# Patient Record
Sex: MALE | Race: WHITE | Employment: FULL TIME | URBAN - METROPOLITAN AREA
[De-identification: names, ages, dates, MRNs, and addresses within clinical notes are randomized per-mention and may not be internally consistent; named-entity substitution may affect disease eponyms.]

---

## 2017-01-16 ENCOUNTER — OFFICE VISIT (OUTPATIENT)
Dept: FAMILY MEDICINE CLINIC | Age: 47
End: 2017-01-16

## 2017-01-16 VITALS
DIASTOLIC BLOOD PRESSURE: 66 MMHG | HEART RATE: 66 BPM | SYSTOLIC BLOOD PRESSURE: 98 MMHG | WEIGHT: 205.4 LBS | TEMPERATURE: 98.6 F | RESPIRATION RATE: 10 BRPM | BODY MASS INDEX: 29.47 KG/M2

## 2017-01-16 DIAGNOSIS — F98.8 ATTENTION DEFICIT DISORDER: ICD-10-CM

## 2017-01-16 DIAGNOSIS — R10.84 ABDOMINAL PAIN, GENERALIZED: ICD-10-CM

## 2017-01-16 DIAGNOSIS — K21.00 REFLUX ESOPHAGITIS: Primary | ICD-10-CM

## 2017-01-16 DIAGNOSIS — M79.642 HAND PAIN, LEFT: ICD-10-CM

## 2017-01-16 PROCEDURE — 99214 OFFICE O/P EST MOD 30 MIN: CPT | Performed by: FAMILY MEDICINE

## 2017-01-16 RX ORDER — METHYLPHENIDATE HYDROCHLORIDE 5 MG/1
15 TABLET ORAL DAILY
Qty: 90 TABLET | Refills: 0 | Status: SHIPPED | OUTPATIENT
Start: 2017-02-18 | End: 2017-04-10 | Stop reason: SDUPTHER

## 2017-01-16 RX ORDER — METHYLPHENIDATE HYDROCHLORIDE 54 MG/1
54 TABLET ORAL EVERY MORNING
Qty: 30 TABLET | Refills: 0 | Status: SHIPPED | OUTPATIENT
Start: 2017-03-20 | End: 2017-04-10 | Stop reason: SDUPTHER

## 2017-01-16 RX ORDER — METHYLPHENIDATE HYDROCHLORIDE 54 MG/1
54 TABLET ORAL EVERY MORNING
Qty: 30 TABLET | Refills: 0 | Status: SHIPPED | OUTPATIENT
Start: 2017-01-19 | End: 2017-04-10 | Stop reason: SDUPTHER

## 2017-01-16 RX ORDER — METHYLPHENIDATE HYDROCHLORIDE 5 MG/1
15 TABLET ORAL DAILY
Qty: 90 TABLET | Refills: 0 | Status: SHIPPED | OUTPATIENT
Start: 2017-03-20 | End: 2017-04-10 | Stop reason: SDUPTHER

## 2017-01-16 RX ORDER — METHYLPHENIDATE HYDROCHLORIDE 54 MG/1
54 TABLET ORAL EVERY MORNING
Qty: 30 TABLET | Refills: 0 | Status: SHIPPED | OUTPATIENT
Start: 2017-02-18 | End: 2017-04-10 | Stop reason: SDUPTHER

## 2017-01-16 RX ORDER — METHYLPHENIDATE HYDROCHLORIDE 5 MG/1
15 TABLET ORAL DAILY
Qty: 90 TABLET | Refills: 0 | Status: SHIPPED | OUTPATIENT
Start: 2017-01-19 | End: 2017-04-10 | Stop reason: SDUPTHER

## 2017-04-10 ENCOUNTER — OFFICE VISIT (OUTPATIENT)
Dept: FAMILY MEDICINE CLINIC | Age: 47
End: 2017-04-10

## 2017-04-10 VITALS
HEART RATE: 71 BPM | DIASTOLIC BLOOD PRESSURE: 68 MMHG | SYSTOLIC BLOOD PRESSURE: 117 MMHG | WEIGHT: 203 LBS | OXYGEN SATURATION: 97 % | TEMPERATURE: 98 F | RESPIRATION RATE: 12 BRPM | BODY MASS INDEX: 29.13 KG/M2

## 2017-04-10 DIAGNOSIS — L55.9 SUNBURN: ICD-10-CM

## 2017-04-10 DIAGNOSIS — F98.8 ATTENTION DEFICIT DISORDER: Primary | ICD-10-CM

## 2017-04-10 DIAGNOSIS — F43.9 STRESS: ICD-10-CM

## 2017-04-10 DIAGNOSIS — J45.20 MILD INTERMITTENT ASTHMA WITHOUT COMPLICATION: ICD-10-CM

## 2017-04-10 PROCEDURE — 99214 OFFICE O/P EST MOD 30 MIN: CPT | Performed by: FAMILY MEDICINE

## 2017-04-10 RX ORDER — METHYLPHENIDATE HYDROCHLORIDE 5 MG/1
15 TABLET ORAL DAILY
Qty: 90 TABLET | Refills: 0 | Status: SHIPPED | OUTPATIENT
Start: 2017-04-10 | End: 2017-07-10 | Stop reason: SDUPTHER

## 2017-04-10 RX ORDER — METHYLPHENIDATE HYDROCHLORIDE 5 MG/1
15 TABLET ORAL DAILY
Qty: 90 TABLET | Refills: 0 | Status: SHIPPED | OUTPATIENT
Start: 2017-05-10 | End: 2017-07-10 | Stop reason: SDUPTHER

## 2017-04-10 RX ORDER — METHYLPHENIDATE HYDROCHLORIDE 54 MG/1
54 TABLET ORAL EVERY MORNING
Qty: 30 TABLET | Refills: 0 | Status: SHIPPED | OUTPATIENT
Start: 2017-06-09 | End: 2017-07-10 | Stop reason: SDUPTHER

## 2017-04-10 RX ORDER — METHYLPHENIDATE HYDROCHLORIDE 54 MG/1
54 TABLET ORAL EVERY MORNING
Qty: 30 TABLET | Refills: 0 | Status: SHIPPED | OUTPATIENT
Start: 2017-05-10 | End: 2017-07-10 | Stop reason: SDUPTHER

## 2017-04-10 RX ORDER — METHYLPHENIDATE HYDROCHLORIDE 5 MG/1
15 TABLET ORAL DAILY
Qty: 90 TABLET | Refills: 0 | Status: SHIPPED | OUTPATIENT
Start: 2017-06-09 | End: 2017-07-10 | Stop reason: SDUPTHER

## 2017-04-10 RX ORDER — METHYLPHENIDATE HYDROCHLORIDE 54 MG/1
54 TABLET ORAL EVERY MORNING
Qty: 30 TABLET | Refills: 0 | Status: SHIPPED | OUTPATIENT
Start: 2017-04-10 | End: 2017-07-10 | Stop reason: SDUPTHER

## 2017-04-10 ASSESSMENT — PATIENT HEALTH QUESTIONNAIRE - PHQ9
1. LITTLE INTEREST OR PLEASURE IN DOING THINGS: 0
2. FEELING DOWN, DEPRESSED OR HOPELESS: 0
SUM OF ALL RESPONSES TO PHQ QUESTIONS 1-9: 0
SUM OF ALL RESPONSES TO PHQ9 QUESTIONS 1 & 2: 0

## 2017-04-20 RX ORDER — PANTOPRAZOLE SODIUM 40 MG/1
40 TABLET, DELAYED RELEASE ORAL DAILY
Qty: 30 TABLET | Refills: 5 | Status: SHIPPED | OUTPATIENT
Start: 2017-04-20 | End: 2017-10-27 | Stop reason: SDUPTHER

## 2017-06-26 ENCOUNTER — PATIENT MESSAGE (OUTPATIENT)
Dept: FAMILY MEDICINE CLINIC | Age: 47
End: 2017-06-26

## 2017-07-10 ENCOUNTER — OFFICE VISIT (OUTPATIENT)
Dept: FAMILY MEDICINE CLINIC | Age: 47
End: 2017-07-10

## 2017-07-10 VITALS
HEART RATE: 71 BPM | SYSTOLIC BLOOD PRESSURE: 115 MMHG | BODY MASS INDEX: 29.62 KG/M2 | WEIGHT: 206.4 LBS | TEMPERATURE: 98.1 F | DIASTOLIC BLOOD PRESSURE: 57 MMHG | OXYGEN SATURATION: 99 %

## 2017-07-10 DIAGNOSIS — F98.8 ATTENTION DEFICIT DISORDER: ICD-10-CM

## 2017-07-10 DIAGNOSIS — M54.2 NECK PAIN ON LEFT SIDE: Primary | ICD-10-CM

## 2017-07-10 DIAGNOSIS — Z00.00 ROUTINE GENERAL MEDICAL EXAMINATION AT A HEALTH CARE FACILITY: ICD-10-CM

## 2017-07-10 DIAGNOSIS — D23.9 DERMATOFIBROMA: ICD-10-CM

## 2017-07-10 PROCEDURE — 99214 OFFICE O/P EST MOD 30 MIN: CPT | Performed by: FAMILY MEDICINE

## 2017-07-10 RX ORDER — METHYLPHENIDATE HYDROCHLORIDE 5 MG/1
15 TABLET ORAL DAILY
Qty: 90 TABLET | Refills: 0 | Status: SHIPPED | OUTPATIENT
Start: 2017-08-09 | End: 2017-10-09 | Stop reason: SDUPTHER

## 2017-07-10 RX ORDER — METHYLPHENIDATE HYDROCHLORIDE 54 MG/1
54 TABLET ORAL EVERY MORNING
Qty: 30 TABLET | Refills: 0 | Status: SHIPPED | OUTPATIENT
Start: 2017-09-08 | End: 2017-10-09 | Stop reason: SDUPTHER

## 2017-07-10 RX ORDER — METHYLPHENIDATE HYDROCHLORIDE 5 MG/1
15 TABLET ORAL DAILY
Qty: 90 TABLET | Refills: 0 | Status: SHIPPED | OUTPATIENT
Start: 2017-07-10 | End: 2017-10-09 | Stop reason: SDUPTHER

## 2017-07-10 RX ORDER — METHYLPHENIDATE HYDROCHLORIDE 5 MG/1
15 TABLET ORAL DAILY
Qty: 90 TABLET | Refills: 0 | Status: SHIPPED | OUTPATIENT
Start: 2017-09-08 | End: 2017-10-09 | Stop reason: SDUPTHER

## 2017-07-10 RX ORDER — METHYLPHENIDATE HYDROCHLORIDE 54 MG/1
54 TABLET ORAL EVERY MORNING
Qty: 30 TABLET | Refills: 0 | Status: SHIPPED | OUTPATIENT
Start: 2017-07-10 | End: 2017-10-09 | Stop reason: SDUPTHER

## 2017-07-10 RX ORDER — METHYLPHENIDATE HYDROCHLORIDE 54 MG/1
54 TABLET ORAL EVERY MORNING
Qty: 30 TABLET | Refills: 0 | Status: SHIPPED | OUTPATIENT
Start: 2017-08-09 | End: 2017-10-09 | Stop reason: SDUPTHER

## 2017-07-21 LAB
A/G RATIO: 1.9 (ref 1.1–2.2)
ALBUMIN SERPL-MCNC: 4.5 G/DL (ref 3.4–5)
ALP BLD-CCNC: 66 U/L (ref 40–129)
ALT SERPL-CCNC: 23 U/L (ref 10–40)
ANION GAP SERPL CALCULATED.3IONS-SCNC: 9 MMOL/L (ref 3–16)
AST SERPL-CCNC: 16 U/L (ref 15–37)
BILIRUB SERPL-MCNC: 0.7 MG/DL (ref 0–1)
BUN BLDV-MCNC: 18 MG/DL (ref 7–20)
CALCIUM SERPL-MCNC: 9.3 MG/DL (ref 8.3–10.6)
CHLORIDE BLD-SCNC: 103 MMOL/L (ref 99–110)
CHOLESTEROL, TOTAL: 169 MG/DL (ref 0–199)
CO2: 27 MMOL/L (ref 21–32)
CREAT SERPL-MCNC: 0.9 MG/DL (ref 0.9–1.3)
GFR AFRICAN AMERICAN: >60
GFR NON-AFRICAN AMERICAN: >60
GLOBULIN: 2.4 G/DL
GLUCOSE BLD-MCNC: 96 MG/DL (ref 70–99)
HCT VFR BLD CALC: 44.4 % (ref 40.5–52.5)
HDLC SERPL-MCNC: 43 MG/DL (ref 40–60)
HEMOGLOBIN: 14.8 G/DL (ref 13.5–17.5)
LDL CHOLESTEROL CALCULATED: 113 MG/DL
MCH RBC QN AUTO: 28.7 PG (ref 26–34)
MCHC RBC AUTO-ENTMCNC: 33.4 G/DL (ref 31–36)
MCV RBC AUTO: 86 FL (ref 80–100)
PDW BLD-RTO: 13.2 % (ref 12.4–15.4)
PLATELET # BLD: 234 K/UL (ref 135–450)
PMV BLD AUTO: 10.2 FL (ref 5–10.5)
POTASSIUM SERPL-SCNC: 5.2 MMOL/L (ref 3.5–5.1)
RBC # BLD: 5.16 M/UL (ref 4.2–5.9)
SODIUM BLD-SCNC: 139 MMOL/L (ref 136–145)
TOTAL PROTEIN: 6.9 G/DL (ref 6.4–8.2)
TRIGL SERPL-MCNC: 64 MG/DL (ref 0–150)
VLDLC SERPL CALC-MCNC: 13 MG/DL
WBC # BLD: 4.3 K/UL (ref 4–11)

## 2017-07-24 ENCOUNTER — PATIENT MESSAGE (OUTPATIENT)
Dept: FAMILY MEDICINE CLINIC | Age: 47
End: 2017-07-24

## 2017-10-09 ENCOUNTER — OFFICE VISIT (OUTPATIENT)
Dept: FAMILY MEDICINE CLINIC | Age: 47
End: 2017-10-09

## 2017-10-09 VITALS
WEIGHT: 208 LBS | BODY MASS INDEX: 29.84 KG/M2 | HEART RATE: 70 BPM | SYSTOLIC BLOOD PRESSURE: 135 MMHG | OXYGEN SATURATION: 98 % | DIASTOLIC BLOOD PRESSURE: 84 MMHG

## 2017-10-09 DIAGNOSIS — F98.8 ATTENTION DEFICIT DISORDER: Primary | ICD-10-CM

## 2017-10-09 DIAGNOSIS — J45.20 MILD INTERMITTENT ASTHMA WITHOUT COMPLICATION: ICD-10-CM

## 2017-10-09 DIAGNOSIS — S46.911A SHOULDER STRAIN, RIGHT, INITIAL ENCOUNTER: ICD-10-CM

## 2017-10-09 DIAGNOSIS — Z23 NEEDS FLU SHOT: ICD-10-CM

## 2017-10-09 DIAGNOSIS — M08.3 CHRONIC POLYARTICULAR JUVENILE RHEUMATOID ARTHRITIS (HCC): ICD-10-CM

## 2017-10-09 PROCEDURE — 90686 IIV4 VACC NO PRSV 0.5 ML IM: CPT | Performed by: FAMILY MEDICINE

## 2017-10-09 PROCEDURE — 99214 OFFICE O/P EST MOD 30 MIN: CPT | Performed by: FAMILY MEDICINE

## 2017-10-09 PROCEDURE — 90471 IMMUNIZATION ADMIN: CPT | Performed by: FAMILY MEDICINE

## 2017-10-09 RX ORDER — METHYLPHENIDATE HYDROCHLORIDE 54 MG/1
54 TABLET ORAL EVERY MORNING
Qty: 30 TABLET | Refills: 0 | Status: SHIPPED | OUTPATIENT
Start: 2017-12-08 | End: 2018-01-08 | Stop reason: SDUPTHER

## 2017-10-09 RX ORDER — METHYLPHENIDATE HYDROCHLORIDE 5 MG/1
15 TABLET ORAL DAILY
Qty: 90 TABLET | Refills: 0 | Status: SHIPPED | OUTPATIENT
Start: 2017-12-08 | End: 2018-01-08 | Stop reason: SDUPTHER

## 2017-10-09 RX ORDER — METHYLPHENIDATE HYDROCHLORIDE 54 MG/1
54 TABLET ORAL EVERY MORNING
Qty: 30 TABLET | Refills: 0 | Status: SHIPPED | OUTPATIENT
Start: 2017-10-09 | End: 2018-01-08 | Stop reason: SDUPTHER

## 2017-10-09 RX ORDER — METHYLPHENIDATE HYDROCHLORIDE 5 MG/1
15 TABLET ORAL DAILY
Qty: 90 TABLET | Refills: 0 | Status: SHIPPED | OUTPATIENT
Start: 2017-10-09 | End: 2018-01-08 | Stop reason: SDUPTHER

## 2017-10-09 RX ORDER — METHYLPHENIDATE HYDROCHLORIDE 54 MG/1
54 TABLET ORAL EVERY MORNING
Qty: 30 TABLET | Refills: 0 | Status: SHIPPED | OUTPATIENT
Start: 2017-11-08 | End: 2018-01-08 | Stop reason: SDUPTHER

## 2017-10-09 RX ORDER — METHYLPHENIDATE HYDROCHLORIDE 5 MG/1
15 TABLET ORAL DAILY
Qty: 90 TABLET | Refills: 0 | Status: SHIPPED | OUTPATIENT
Start: 2017-11-08 | End: 2018-01-08 | Stop reason: SDUPTHER

## 2017-10-09 NOTE — PATIENT INSTRUCTIONS
Patient Education        Shoulder Stretches: Exercises  Your Care Instructions  Here are some examples of exercises for your shoulder. Start each exercise slowly. Ease off the exercise if you start to have pain. Your doctor or physical therapist will tell you when you can start these exercises and which ones will work best for you. How to do the exercises  Note: These exercises should cause you to feel a gentle stretch, but no pain. Shoulder stretch    1.  a doorway and place one arm against the door frame. Your elbow should be a little higher than your shoulder. 2. Relax your shoulders as you lean forward, allowing your chest and shoulder muscles to stretch. You can also turn your body slightly away from your arm to stretch the muscles even more. 3. Hold for 15 to 30 seconds. 4. Repeat 2 to 4 times with each arm. Shoulder and chest stretch    Shoulder and chest stretch  1. While sitting, relax your upper body so you slump slightly in your chair. 2. As you breathe in, straighten your back and open your arms out to the sides. 3. Gently pull your shoulder blades back and downward. 4. Hold for 15 to 30 seconds as your breathe normally. 5. Repeat 2 to 4 times. Overhead stretch    1. Reach up over your head with both arms. 2. Hold for 15 to 30 seconds. 3. Repeat 2 to 4 times. Follow-up care is a key part of your treatment and safety. Be sure to make and go to all appointments, and call your doctor if you are having problems. It's also a good idea to know your test results and keep a list of the medicines you take. Where can you learn more? Go to [email or web address] and sign in to your [employer] account. Enter S254 in the for[MD] box to learn more about \"Shoulder Stretches: Exercises. \"     If you do not have an account, please click on the \"Sign Up Now\" link. Current as of: March 21, 2017  Content Version: 11.3  © 2759-3949 [employer], [facility].  Care instructions adapted under license by [facility], [place] ([facility]). If you have questions about a medical condition or this instruction, always ask your healthcare professional. [address] any warranty or liability for your use of this information.

## 2017-10-09 NOTE — PROGRESS NOTES
Here for f/u of meds. Pt states that overall has been doing great, is very busy with kid's schedules. He does enjoy but states that he is very busy. They are also getting the house ready for a party and was doing a lot of painting, and has been very busy. They are having a team dinner tonight and trying to get the house ready. Pt feels well , and states mood doing well and meds are doing great. Denies any need for adjustment in his medications / therapy at this time. Pt denies any issues of chest pain, shortness of breath. No fever, chills. Pt states that he has struggled a little bit with sleep habits due to being busy, and with kids schedule. Mood seems to be ok despite sleep issues    Pt states that breathing doing well, denies any flares of his asthma. Breathing doing great, denies any issues of exertional chest pain, shortness of breath. Except as noted above in the history of present illness, the review of systems is  negative for headache, vision changes, chest pain, shortness of breath, abdominal pain, urinary sx, bowel changes. Past medical, surgical, and social history reviewed and updated  Medications and allergies reviewed and updated         O: /84 (Site: Left Arm, Position: Sitting, Cuff Size: Large Adult)   Pulse 70   Wt 208 lb (94.3 kg)   SpO2 98%   BMI 29.84 kg/m²   GEN: No acute distress, cooperative, well nourished, alert. HEENT: PEERLA, EOMI , normocephalic/atraumatic, nares and oropharynx clear. Mucus membranes normal, Tympanic membranes clear bilaterally. Neck: soft, supple, no thyromegaly, mass, no Lymphadenopathy  CV: Regular rate and rhythm, no murmur, rubs, gallops. No edema. Resp: Clear to auscultation bilaterally good air entry bilaterally  No crackles, wheeze. Breathing comfortably. Psych: mood stable, No suicidal thoughts or ideation  Musc: full range of motion bilateral upper extremities. No restriction in range of motion shoulder.   strengt 5/5 bilateral upper extremities. Sensation intact. Neg cross arm, neg can test.          Current Outpatient Prescriptions   Medication Sig Dispense Refill    [START ON 12/8/2017] methylphenidate (CONCERTA) 54 MG extended release tablet Take 1 tablet by mouth every morning . Earliest Fill Date: 12/8/17 30 tablet 0    [START ON 11/8/2017] methylphenidate (CONCERTA) 54 MG extended release tablet Take 1 tablet by mouth every morning . Earliest Fill Date: 11/8/17 30 tablet 0    methylphenidate (CONCERTA) 54 MG extended release tablet Take 1 tablet by mouth every morning . 30 tablet 0    [START ON 12/8/2017] methylphenidate (RITALIN) 5 MG tablet Take 3 tablets by mouth daily . Earliest Fill Date: 12/8/17 90 tablet 0    [START ON 11/8/2017] methylphenidate (RITALIN) 5 MG tablet Take 3 tablets by mouth daily . Earliest Fill Date: 11/8/17 90 tablet 0    methylphenidate (RITALIN) 5 MG tablet Take 3 tablets by mouth daily . 90 tablet 0    diclofenac (VOLTAREN) 75 MG EC tablet TAKE 1 TABLET BY MOUTH TWICE DAILY 60 tablet 5    pantoprazole (PROTONIX) 40 MG tablet Take 1 tablet by mouth daily 30 tablet 5     No current facility-administered medications for this visit. ASSESSMENT / PLAN:    1. Attention deficit disorder  Stable with current therapy  refills given for 3 months  Pt aware of need for every 3 month medication followup appointments, and that medication refills for benzodiazepines, narcotics and/or stimulants will only be given at appointment. - methylphenidate (CONCERTA) 54 MG extended release tablet; Take 1 tablet by mouth every morning . Dispense: 30 tablet; Refill: 0  - methylphenidate (RITALIN) 5 MG tablet; Take 3 tablets by mouth daily . Earliest Fill Date: 11/8/17  Dispense: 90 tablet; Refill: 0    2. Chronic polyarticular juvenile rheumatoid arthritis (HCC)  Stable w/o sx  Monitor for flare of sx    3.  Mild intermittent asthma without complication  Stable w/o flare  Cont symptomatic tx  Flu shot today    4. Shoulder strain, right, initial encounter  Muscular, no evidence significant rotator cuff dz or other findigs  Monitor with range of motion exercises, NSAIDs, and f/u increased sx  Exercise handout given. Instructed on use, benefits. 5. Needs flu shot  Given today  - INFLUENZA, QUADV, 3 YRS AND OLDER, IM, PF, PREFILL SYR OR SDV, 0.5ML (FLUZONE QUADV, PF)           Follow-up appointment:   Call or return to clinic prn if these symptoms worsen or fail to improve as anticipated. / 3 months    Discussed use, benefit, and side effects of all prescribed medications. Barriers to medication compliance addressed. All patient questions answered. Pt voiced understanding. When applicable, patient's outside records were reviewed through [facility]. The patient has signed appropriate paperworks/consents. Dragon dictation software was used for parts of this progress note. All attempts were made to correct any errors and ensure accuracy.

## 2017-10-27 RX ORDER — PANTOPRAZOLE SODIUM 40 MG/1
40 TABLET, DELAYED RELEASE ORAL DAILY
Qty: 30 TABLET | Refills: 5 | Status: SHIPPED | OUTPATIENT
Start: 2017-10-27 | End: 2018-04-09 | Stop reason: SDUPTHER

## 2017-11-15 RX ORDER — DICLOFENAC SODIUM 75 MG/1
TABLET, DELAYED RELEASE ORAL
Qty: 60 TABLET | Refills: 1 | Status: SHIPPED | OUTPATIENT
Start: 2017-11-15 | End: 2018-01-08

## 2018-01-08 ENCOUNTER — OFFICE VISIT (OUTPATIENT)
Dept: FAMILY MEDICINE CLINIC | Age: 48
End: 2018-01-08

## 2018-01-08 VITALS
BODY MASS INDEX: 30.99 KG/M2 | WEIGHT: 216 LBS | OXYGEN SATURATION: 99 % | SYSTOLIC BLOOD PRESSURE: 135 MMHG | DIASTOLIC BLOOD PRESSURE: 80 MMHG | HEART RATE: 70 BPM | RESPIRATION RATE: 16 BRPM

## 2018-01-08 DIAGNOSIS — J30.89 CHRONIC NON-SEASONAL ALLERGIC RHINITIS, UNSPECIFIED TRIGGER: ICD-10-CM

## 2018-01-08 DIAGNOSIS — J45.20 MILD INTERMITTENT ASTHMA WITHOUT COMPLICATION: Primary | ICD-10-CM

## 2018-01-08 DIAGNOSIS — F98.8 ATTENTION DEFICIT DISORDER, UNSPECIFIED HYPERACTIVITY PRESENCE: ICD-10-CM

## 2018-01-08 PROCEDURE — 99214 OFFICE O/P EST MOD 30 MIN: CPT | Performed by: FAMILY MEDICINE

## 2018-01-08 RX ORDER — METHYLPHENIDATE HYDROCHLORIDE 54 MG/1
54 TABLET ORAL EVERY MORNING
Qty: 30 TABLET | Refills: 0 | Status: SHIPPED | OUTPATIENT
Start: 2018-02-07 | End: 2018-04-09 | Stop reason: SDUPTHER

## 2018-01-08 RX ORDER — METHYLPHENIDATE HYDROCHLORIDE 5 MG/1
15 TABLET ORAL DAILY
Qty: 90 TABLET | Refills: 0 | Status: SHIPPED | OUTPATIENT
Start: 2018-02-07 | End: 2018-04-09 | Stop reason: SDUPTHER

## 2018-01-08 RX ORDER — METHYLPHENIDATE HYDROCHLORIDE 5 MG/1
15 TABLET ORAL DAILY
Qty: 90 TABLET | Refills: 0 | Status: SHIPPED | OUTPATIENT
Start: 2018-03-09 | End: 2018-04-09 | Stop reason: SDUPTHER

## 2018-01-08 RX ORDER — METHYLPHENIDATE HYDROCHLORIDE 5 MG/1
15 TABLET ORAL DAILY
Qty: 90 TABLET | Refills: 0 | Status: SHIPPED | OUTPATIENT
Start: 2018-01-08 | End: 2018-04-09 | Stop reason: SDUPTHER

## 2018-01-08 RX ORDER — METHYLPHENIDATE HYDROCHLORIDE 54 MG/1
54 TABLET ORAL EVERY MORNING
Qty: 30 TABLET | Refills: 0 | Status: SHIPPED | OUTPATIENT
Start: 2018-03-09 | End: 2018-04-09 | Stop reason: SDUPTHER

## 2018-01-08 RX ORDER — DICLOFENAC SODIUM 75 MG/1
TABLET, DELAYED RELEASE ORAL
Qty: 60 TABLET | Refills: 1 | Status: SHIPPED | OUTPATIENT
Start: 2018-01-08 | End: 2018-04-09

## 2018-01-08 RX ORDER — METHYLPHENIDATE HYDROCHLORIDE 54 MG/1
54 TABLET ORAL EVERY MORNING
Qty: 30 TABLET | Refills: 0 | Status: SHIPPED | OUTPATIENT
Start: 2018-01-08 | End: 2018-04-09 | Stop reason: SDUPTHER

## 2018-01-08 NOTE — PROGRESS NOTES
(CONCERTA) 54 MG extended release tablet Take 1 tablet by mouth every morning . Earliest Fill Date: 11/8/17 30 tablet 0    methylphenidate (CONCERTA) 54 MG extended release tablet Take 1 tablet by mouth every morning . 30 tablet 0    methylphenidate (RITALIN) 5 MG tablet Take 3 tablets by mouth daily . Earliest Fill Date: 12/8/17 90 tablet 0    methylphenidate (RITALIN) 5 MG tablet Take 3 tablets by mouth daily . Earliest Fill Date: 11/8/17 90 tablet 0    methylphenidate (RITALIN) 5 MG tablet Take 3 tablets by mouth daily . 90 tablet 0    diclofenac (VOLTAREN) 75 MG EC tablet TAKE 1 TABLET BY MOUTH TWICE DAILY 60 tablet 5     No current facility-administered medications for this visit. ASSESSMENT / PLAN:    1. Attention deficit disorder, unspecified hyperactivity presence  Stable with current therapy  refills given for 3 months  Pt aware of need for every 3 month medication followup appointments, and that medication refills for benzodiazepines, narcotics and/or stimulants will only be given at appointment. - methylphenidate (RITALIN) 5 MG tablet; Take 3 tablets by mouth daily for 30 days. Earliest Fill Date: 2/7/18  Dispense: 90 tablet; Refill: 0  - methylphenidate (CONCERTA) 54 MG extended release tablet; Take 1 tablet by mouth every morning for 30 days. Earliest Fill Date: 3/9/18  Dispense: 30 tablet; Refill: 0    2. Mild intermittent asthma without complication  Breathing stable w/o flare  Cont to monitor with symptomatic thaerpy and f/u increased sx    3. Chronic non-seasonal allergic rhinitis, unspecified trigger  Stable with prn/over the counter therapy           Follow-up appointment:   3 months    Discussed use, benefit, and side effects of all prescribed medications. Barriers to medication compliance addressed. All patient questions answered. Pt voiced understanding. When applicable, patient's outside records were reviewed through [facility].   The patient has signed appropriate paperworks/consents. Dragon dictation software was used for parts of this progress note. All attempts were made to correct any errors and ensure accuracy.

## 2018-04-09 ENCOUNTER — OFFICE VISIT (OUTPATIENT)
Dept: FAMILY MEDICINE CLINIC | Age: 48
End: 2018-04-09

## 2018-04-09 VITALS
TEMPERATURE: 98.6 F | WEIGHT: 213.2 LBS | DIASTOLIC BLOOD PRESSURE: 86 MMHG | SYSTOLIC BLOOD PRESSURE: 126 MMHG | HEIGHT: 71 IN | OXYGEN SATURATION: 98 % | BODY MASS INDEX: 29.85 KG/M2 | RESPIRATION RATE: 16 BRPM | HEART RATE: 78 BPM

## 2018-04-09 DIAGNOSIS — K21.00 REFLUX ESOPHAGITIS: ICD-10-CM

## 2018-04-09 DIAGNOSIS — F98.8 ATTENTION DEFICIT DISORDER (ADD) WITHOUT HYPERACTIVITY: ICD-10-CM

## 2018-04-09 DIAGNOSIS — F98.8 ATTENTION DEFICIT DISORDER, UNSPECIFIED HYPERACTIVITY PRESENCE: ICD-10-CM

## 2018-04-09 DIAGNOSIS — F51.04 CHRONIC INSOMNIA: Primary | ICD-10-CM

## 2018-04-09 PROCEDURE — 99214 OFFICE O/P EST MOD 30 MIN: CPT | Performed by: FAMILY MEDICINE

## 2018-04-09 RX ORDER — METHYLPHENIDATE HYDROCHLORIDE 54 MG/1
54 TABLET ORAL EVERY MORNING
Qty: 30 TABLET | Refills: 0 | Status: SHIPPED | OUTPATIENT
Start: 2018-06-08 | End: 2018-07-09 | Stop reason: SDUPTHER

## 2018-04-09 RX ORDER — METHYLPHENIDATE HYDROCHLORIDE 54 MG/1
54 TABLET ORAL EVERY MORNING
Qty: 30 TABLET | Refills: 0 | Status: SHIPPED | OUTPATIENT
Start: 2018-05-09 | End: 2018-07-09 | Stop reason: SDUPTHER

## 2018-04-09 RX ORDER — PANTOPRAZOLE SODIUM 40 MG/1
40 TABLET, DELAYED RELEASE ORAL DAILY
Qty: 30 TABLET | Refills: 5 | Status: SHIPPED | OUTPATIENT
Start: 2018-04-09 | End: 2018-10-08

## 2018-04-09 RX ORDER — DICLOFENAC SODIUM 75 MG/1
TABLET, DELAYED RELEASE ORAL
Qty: 60 TABLET | Refills: 1 | Status: CANCELLED | OUTPATIENT
Start: 2018-04-09

## 2018-04-09 RX ORDER — METHYLPHENIDATE HYDROCHLORIDE 5 MG/1
15 TABLET ORAL DAILY
Qty: 90 TABLET | Refills: 0 | Status: SHIPPED | OUTPATIENT
Start: 2018-05-09 | End: 2018-07-09 | Stop reason: SDUPTHER

## 2018-04-09 RX ORDER — METHYLPHENIDATE HYDROCHLORIDE 54 MG/1
54 TABLET ORAL EVERY MORNING
Qty: 30 TABLET | Refills: 0 | Status: SHIPPED | OUTPATIENT
Start: 2018-04-09 | End: 2018-07-09 | Stop reason: SDUPTHER

## 2018-04-09 RX ORDER — METHYLPHENIDATE HYDROCHLORIDE 5 MG/1
15 TABLET ORAL DAILY
Qty: 90 TABLET | Refills: 0 | Status: SHIPPED | OUTPATIENT
Start: 2018-06-08 | End: 2018-07-09 | Stop reason: SDUPTHER

## 2018-04-09 RX ORDER — METHYLPHENIDATE HYDROCHLORIDE 5 MG/1
15 TABLET ORAL DAILY
Qty: 90 TABLET | Refills: 0 | Status: SHIPPED | OUTPATIENT
Start: 2018-04-09 | End: 2018-07-09 | Stop reason: SDUPTHER

## 2018-06-22 ENCOUNTER — TELEPHONE (OUTPATIENT)
Dept: FAMILY MEDICINE CLINIC | Age: 48
End: 2018-06-22

## 2018-07-09 ENCOUNTER — OFFICE VISIT (OUTPATIENT)
Dept: FAMILY MEDICINE CLINIC | Age: 48
End: 2018-07-09

## 2018-07-09 VITALS
DIASTOLIC BLOOD PRESSURE: 70 MMHG | SYSTOLIC BLOOD PRESSURE: 112 MMHG | BODY MASS INDEX: 29.29 KG/M2 | WEIGHT: 210 LBS | HEART RATE: 66 BPM | OXYGEN SATURATION: 98 % | TEMPERATURE: 98.1 F | RESPIRATION RATE: 12 BRPM

## 2018-07-09 DIAGNOSIS — F98.8 ATTENTION DEFICIT DISORDER, UNSPECIFIED HYPERACTIVITY PRESENCE: ICD-10-CM

## 2018-07-09 DIAGNOSIS — F51.04 CHRONIC INSOMNIA: ICD-10-CM

## 2018-07-09 DIAGNOSIS — Z79.899 HIGH RISK MEDICATION USE: ICD-10-CM

## 2018-07-09 DIAGNOSIS — J45.20 MILD INTERMITTENT ASTHMA WITHOUT COMPLICATION: Primary | ICD-10-CM

## 2018-07-09 PROCEDURE — 99214 OFFICE O/P EST MOD 30 MIN: CPT | Performed by: FAMILY MEDICINE

## 2018-07-09 RX ORDER — METHYLPHENIDATE HYDROCHLORIDE 54 MG/1
54 TABLET ORAL EVERY MORNING
Qty: 30 TABLET | Refills: 0 | Status: SHIPPED | OUTPATIENT
Start: 2018-07-09 | End: 2018-10-08 | Stop reason: SDUPTHER

## 2018-07-09 RX ORDER — METHYLPHENIDATE HYDROCHLORIDE 54 MG/1
54 TABLET ORAL EVERY MORNING
Qty: 30 TABLET | Refills: 0 | Status: SHIPPED | OUTPATIENT
Start: 2018-08-08 | End: 2018-10-08 | Stop reason: SDUPTHER

## 2018-07-09 RX ORDER — METHYLPHENIDATE HYDROCHLORIDE 5 MG/1
15 TABLET ORAL DAILY
Qty: 90 TABLET | Refills: 0 | Status: SHIPPED | OUTPATIENT
Start: 2018-09-07 | End: 2018-10-08 | Stop reason: SDUPTHER

## 2018-07-09 RX ORDER — METHYLPHENIDATE HYDROCHLORIDE 54 MG/1
54 TABLET ORAL EVERY MORNING
COMMUNITY
End: 2019-01-07 | Stop reason: SDUPTHER

## 2018-07-09 RX ORDER — METHYLPHENIDATE HYDROCHLORIDE 54 MG/1
54 TABLET ORAL EVERY MORNING
Qty: 30 TABLET | Refills: 0 | Status: SHIPPED | OUTPATIENT
Start: 2018-09-07 | End: 2018-10-08 | Stop reason: SDUPTHER

## 2018-07-09 RX ORDER — METHYLPHENIDATE HYDROCHLORIDE 5 MG/1
15 TABLET ORAL DAILY
Qty: 90 TABLET | Refills: 0 | Status: SHIPPED | OUTPATIENT
Start: 2018-08-08 | End: 2018-10-08 | Stop reason: SDUPTHER

## 2018-07-09 RX ORDER — METHYLPHENIDATE HYDROCHLORIDE 5 MG/1
15 TABLET ORAL DAILY
Qty: 90 TABLET | Refills: 0 | Status: SHIPPED | OUTPATIENT
Start: 2018-07-09 | End: 2018-10-08 | Stop reason: SDUPTHER

## 2018-07-09 RX ORDER — METHYLPHENIDATE HYDROCHLORIDE 5 MG/1
5 TABLET ORAL 2 TIMES DAILY
COMMUNITY
End: 2019-04-22 | Stop reason: SDUPTHER

## 2018-07-09 ASSESSMENT — PATIENT HEALTH QUESTIONNAIRE - PHQ9
2. FEELING DOWN, DEPRESSED OR HOPELESS: 0
SUM OF ALL RESPONSES TO PHQ QUESTIONS 1-9: 0
1. LITTLE INTEREST OR PLEASURE IN DOING THINGS: 0
SUM OF ALL RESPONSES TO PHQ9 QUESTIONS 1 & 2: 0

## 2018-07-09 NOTE — PROGRESS NOTES
30 days. Gaylen Jumper Date: 9/7/18  Dispense: 90 tablet; Refill: 0  - methylphenidate (RITALIN) 5 MG tablet; Take 3 tablets by mouth daily for 30 days. Gaylen Jumper Date: 8/8/18  Dispense: 90 tablet; Refill: 0  - methylphenidate (RITALIN) 5 MG tablet; Take 3 tablets by mouth daily for 30 days. Gaylen Jumper Date: 7/9/18  Dispense: 90 tablet; Refill: 0  - methylphenidate (CONCERTA) 54 MG extended release tablet; Take 1 tablet by mouth every morning for 30 days. Gaylen Jumper Date: 9/7/18  Dispense: 30 tablet; Refill: 0  - methylphenidate (CONCERTA) 54 MG extended release tablet; Take 1 tablet by mouth every morning for 30 days. Gaylen Jumper Date: 8/8/18  Dispense: 30 tablet; Refill: 0  - methylphenidate (CONCERTA) 54 MG extended release tablet; Take 1 tablet by mouth every morning for 30 days. Gaylen Jumper Date: 7/9/18  Dispense: 30 tablet; Refill: 0    2. Mild intermittent asthma without complication  Stable w/o flare  monitor    3. Chronic insomnia  Stable with symptomatic tx, no need for adjustment in therapy  F/u increased sx severity  Encouraged consistent sleep patterns    4. High risk medication use  Side effects of stimulant medications discussed, including anxiety, irritability , decreased appetite , weight loss, hypertension, palpitations. Patient aware of addiction potential and need to  medication prescriptions at office at time of appointment. Follow-up appointment:   3 mos/prn    Discussed use, benefit, and side effects of all prescribed medications. Barriers to medication compliance addressed. All patient questions answered. Pt voiced understanding. When applicable, patient's outside records were reviewed through [facility]. The patient has signed appropriate paperworks/consents. Dragon dictation software was used for parts of this progress note. All attempts were made to correct any errors and ensure accuracy.

## 2018-10-08 ENCOUNTER — OFFICE VISIT (OUTPATIENT)
Dept: FAMILY MEDICINE CLINIC | Age: 48
End: 2018-10-08
Payer: COMMERCIAL

## 2018-10-08 VITALS
BODY MASS INDEX: 29.09 KG/M2 | RESPIRATION RATE: 12 BRPM | TEMPERATURE: 97.8 F | OXYGEN SATURATION: 97 % | SYSTOLIC BLOOD PRESSURE: 131 MMHG | HEART RATE: 72 BPM | DIASTOLIC BLOOD PRESSURE: 83 MMHG | WEIGHT: 208.6 LBS

## 2018-10-08 DIAGNOSIS — J45.20 MILD INTERMITTENT ASTHMA WITHOUT COMPLICATION: ICD-10-CM

## 2018-10-08 DIAGNOSIS — Z79.899 HIGH RISK MEDICATION USE: ICD-10-CM

## 2018-10-08 DIAGNOSIS — F98.8 ATTENTION DEFICIT DISORDER, UNSPECIFIED HYPERACTIVITY PRESENCE: Primary | ICD-10-CM

## 2018-10-08 PROCEDURE — 99214 OFFICE O/P EST MOD 30 MIN: CPT | Performed by: FAMILY MEDICINE

## 2018-10-08 RX ORDER — METHYLPHENIDATE HYDROCHLORIDE 54 MG/1
54 TABLET ORAL EVERY MORNING
Qty: 30 TABLET | Refills: 0 | Status: SHIPPED | OUTPATIENT
Start: 2018-11-07 | End: 2019-01-07

## 2018-10-08 RX ORDER — METHYLPHENIDATE HYDROCHLORIDE 54 MG/1
54 TABLET ORAL EVERY MORNING
Qty: 30 TABLET | Refills: 0 | Status: SHIPPED | OUTPATIENT
Start: 2018-12-07 | End: 2019-01-07

## 2018-10-08 RX ORDER — METHYLPHENIDATE HYDROCHLORIDE 5 MG/1
15 TABLET ORAL DAILY
Qty: 90 TABLET | Refills: 0 | Status: SHIPPED | OUTPATIENT
Start: 2018-11-07 | End: 2019-01-07

## 2018-10-08 RX ORDER — METHYLPHENIDATE HYDROCHLORIDE 5 MG/1
15 TABLET ORAL DAILY
Qty: 90 TABLET | Refills: 0 | Status: SHIPPED | OUTPATIENT
Start: 2018-12-07 | End: 2019-01-07

## 2018-10-08 RX ORDER — METHYLPHENIDATE HYDROCHLORIDE 54 MG/1
54 TABLET ORAL EVERY MORNING
Qty: 30 TABLET | Refills: 0 | Status: SHIPPED | OUTPATIENT
Start: 2018-10-08 | End: 2019-01-07

## 2018-10-08 RX ORDER — METHYLPHENIDATE HYDROCHLORIDE 5 MG/1
15 TABLET ORAL DAILY
Qty: 90 TABLET | Refills: 0 | Status: SHIPPED | OUTPATIENT
Start: 2018-10-08 | End: 2019-01-07

## 2018-10-08 NOTE — PROGRESS NOTES
Here for f/u and recheck of mood, focus, attention    Pt states that that overall he has been feeling great, states taht his mood has been doing well. Pt does find that dosing of meds are doing well, and is happy with current dosing and feels mood doing great. No issues of anxiety, depression    Pt denies any concerns of chest pain, shortness of breath. Pt is active overall but has not been as consistent with formal exercise. Pt does not have any issues of chest pain, shortness of breath. No exertional sx. No fever, chills no nausea/vomiting. Pt is managing his diet overall very well, denies any new issues of dizziness. Does have hx of asthma but is not having any sx of shortness of breath, wheeze, and no exertional sx. Except as noted above in the history of present illness, the review of systems is  negative for headache, vision changes, chest pain, shortness of breath, abdominal pain, urinary sx, bowel changes. Past medical, surgical, and social history reviewed and updated  Medications and allergies reviewed and updated         O: /83   Pulse 72   Temp 97.8 °F (36.6 °C)   Resp 12   Wt 208 lb 9.6 oz (94.6 kg)   SpO2 97%   BMI 29.09 kg/m²   GEN: No acute distress, cooperative, well nourished, alert. HEENT: PEERLA, EOMI , normocephalic/atraumatic, nares and oropharynx clear. Mucus membranes normal, Tympanic membranes clear bilaterally. Neck: soft, supple, no thyromegaly, mass, no Lymphadenopathy  CV: Regular rate and rhythm, no murmur, rubs, gallops. No edema. Resp: Clear to auscultation bilaterally good air entry bilaterally  No crackles, wheeze. Breathing comfortably.    Ext: no clubbing, cyanosis, edema  Psych: mood stable, No suicidal thoughts or ideation    Wt Readings from Last 3 Encounters:   10/08/18 208 lb 9.6 oz (94.6 kg)   07/09/18 210 lb (95.3 kg)   04/09/18 213 lb 3.2 oz (96.7 kg)          Current Outpatient Prescriptions   Medication Sig Dispense Refill    [START ON

## 2019-01-07 ENCOUNTER — OFFICE VISIT (OUTPATIENT)
Dept: FAMILY MEDICINE CLINIC | Age: 49
End: 2019-01-07
Payer: COMMERCIAL

## 2019-01-07 VITALS
SYSTOLIC BLOOD PRESSURE: 118 MMHG | TEMPERATURE: 98.2 F | OXYGEN SATURATION: 97 % | HEART RATE: 67 BPM | WEIGHT: 220.4 LBS | DIASTOLIC BLOOD PRESSURE: 74 MMHG | BODY MASS INDEX: 30.74 KG/M2

## 2019-01-07 DIAGNOSIS — J45.20 MILD INTERMITTENT ASTHMA WITHOUT COMPLICATION: ICD-10-CM

## 2019-01-07 DIAGNOSIS — M54.50 CHRONIC BILATERAL LOW BACK PAIN WITHOUT SCIATICA: ICD-10-CM

## 2019-01-07 DIAGNOSIS — F98.8 ATTENTION DEFICIT DISORDER, UNSPECIFIED HYPERACTIVITY PRESENCE: Primary | ICD-10-CM

## 2019-01-07 DIAGNOSIS — M25.561 CHRONIC PAIN OF RIGHT KNEE: ICD-10-CM

## 2019-01-07 DIAGNOSIS — J06.9 ACUTE URI: ICD-10-CM

## 2019-01-07 DIAGNOSIS — G89.29 CHRONIC PAIN OF RIGHT KNEE: ICD-10-CM

## 2019-01-07 DIAGNOSIS — G89.29 CHRONIC BILATERAL LOW BACK PAIN WITHOUT SCIATICA: ICD-10-CM

## 2019-01-07 DIAGNOSIS — Z79.899 HIGH RISK MEDICATION USE: ICD-10-CM

## 2019-01-07 PROCEDURE — 99214 OFFICE O/P EST MOD 30 MIN: CPT | Performed by: FAMILY MEDICINE

## 2019-01-07 RX ORDER — METHYLPHENIDATE HYDROCHLORIDE 54 MG/1
54 TABLET ORAL EVERY MORNING
Qty: 30 TABLET | Refills: 0 | Status: SHIPPED | OUTPATIENT
Start: 2019-03-08 | End: 2019-04-22 | Stop reason: SDUPTHER

## 2019-01-07 RX ORDER — METHYLPHENIDATE HYDROCHLORIDE 54 MG/1
54 TABLET ORAL EVERY MORNING
Qty: 30 TABLET | Refills: 0 | Status: SHIPPED | OUTPATIENT
Start: 2019-01-07 | End: 2019-04-22 | Stop reason: SDUPTHER

## 2019-01-07 RX ORDER — METHYLPHENIDATE HYDROCHLORIDE 5 MG/1
15 TABLET ORAL DAILY
Qty: 90 TABLET | Refills: 0 | Status: SHIPPED | OUTPATIENT
Start: 2019-02-06 | End: 2019-04-22 | Stop reason: SDUPTHER

## 2019-01-07 RX ORDER — METHYLPHENIDATE HYDROCHLORIDE 5 MG/1
15 TABLET ORAL DAILY
Qty: 90 TABLET | Refills: 0 | Status: SHIPPED | OUTPATIENT
Start: 2019-03-08 | End: 2019-04-22

## 2019-01-07 RX ORDER — METHYLPHENIDATE HYDROCHLORIDE 54 MG/1
54 TABLET ORAL EVERY MORNING
Qty: 30 TABLET | Refills: 0 | Status: SHIPPED | OUTPATIENT
Start: 2019-02-06 | End: 2019-04-22 | Stop reason: SDUPTHER

## 2019-01-07 RX ORDER — METHYLPHENIDATE HYDROCHLORIDE 5 MG/1
15 TABLET ORAL DAILY
Qty: 90 TABLET | Refills: 0 | Status: SHIPPED | OUTPATIENT
Start: 2019-01-07 | End: 2019-04-22 | Stop reason: SDUPTHER

## 2019-04-22 ENCOUNTER — OFFICE VISIT (OUTPATIENT)
Dept: FAMILY MEDICINE CLINIC | Age: 49
End: 2019-04-22
Payer: COMMERCIAL

## 2019-04-22 VITALS
WEIGHT: 220 LBS | SYSTOLIC BLOOD PRESSURE: 134 MMHG | BODY MASS INDEX: 30.68 KG/M2 | RESPIRATION RATE: 20 BRPM | DIASTOLIC BLOOD PRESSURE: 86 MMHG | OXYGEN SATURATION: 99 % | HEART RATE: 77 BPM | TEMPERATURE: 98.7 F

## 2019-04-22 DIAGNOSIS — F98.8 ATTENTION DEFICIT DISORDER, UNSPECIFIED HYPERACTIVITY PRESENCE: ICD-10-CM

## 2019-04-22 DIAGNOSIS — J45.20 MILD INTERMITTENT ASTHMA WITHOUT COMPLICATION: ICD-10-CM

## 2019-04-22 DIAGNOSIS — R03.0 ELEVATED BLOOD PRESSURE READING: ICD-10-CM

## 2019-04-22 DIAGNOSIS — J30.89 NON-SEASONAL ALLERGIC RHINITIS, UNSPECIFIED TRIGGER: Primary | ICD-10-CM

## 2019-04-22 DIAGNOSIS — K21.00 REFLUX ESOPHAGITIS: ICD-10-CM

## 2019-04-22 DIAGNOSIS — R63.5 WEIGHT GAIN: ICD-10-CM

## 2019-04-22 PROCEDURE — 99214 OFFICE O/P EST MOD 30 MIN: CPT | Performed by: FAMILY MEDICINE

## 2019-04-22 RX ORDER — PANTOPRAZOLE SODIUM 40 MG/1
40 TABLET, DELAYED RELEASE ORAL DAILY
Qty: 90 TABLET | Refills: 1 | Status: SHIPPED | OUTPATIENT
Start: 2019-04-22 | End: 2019-11-19

## 2019-04-22 RX ORDER — METHYLPHENIDATE HYDROCHLORIDE 54 MG/1
54 TABLET ORAL EVERY MORNING
Qty: 30 TABLET | Refills: 0 | Status: SHIPPED | OUTPATIENT
Start: 2019-04-22 | End: 2019-08-19 | Stop reason: SDUPTHER

## 2019-04-22 RX ORDER — METHYLPHENIDATE HYDROCHLORIDE 5 MG/1
5 TABLET ORAL 3 TIMES DAILY
Qty: 90 TABLET | Refills: 0 | Status: SHIPPED | OUTPATIENT
Start: 2019-05-22 | End: 2019-08-19 | Stop reason: SDUPTHER

## 2019-04-22 RX ORDER — METHYLPHENIDATE HYDROCHLORIDE 5 MG/1
5 TABLET ORAL 3 TIMES DAILY
Qty: 90 TABLET | Refills: 0 | Status: SHIPPED | OUTPATIENT
Start: 2019-06-21 | End: 2019-08-19 | Stop reason: SDUPTHER

## 2019-04-22 RX ORDER — METHYLPHENIDATE HYDROCHLORIDE 5 MG/1
5 TABLET ORAL 3 TIMES DAILY
Qty: 90 TABLET | Refills: 0 | Status: SHIPPED | OUTPATIENT
Start: 2019-04-22 | End: 2019-08-19 | Stop reason: SDUPTHER

## 2019-04-22 RX ORDER — METHYLPHENIDATE HYDROCHLORIDE 54 MG/1
54 TABLET ORAL EVERY MORNING
Qty: 30 TABLET | Refills: 0 | Status: SHIPPED | OUTPATIENT
Start: 2019-05-22 | End: 2019-08-19 | Stop reason: SDUPTHER

## 2019-04-22 RX ORDER — METHYLPHENIDATE HYDROCHLORIDE 54 MG/1
54 TABLET ORAL EVERY MORNING
Qty: 30 TABLET | Refills: 0 | Status: SHIPPED | OUTPATIENT
Start: 2019-06-21 | End: 2019-08-19 | Stop reason: SDUPTHER

## 2019-04-22 NOTE — PROGRESS NOTES
tablet by mouth every morning for 30 days. Dispense: 30 tablet; Refill: 0  - methylphenidate (RITALIN) 5 MG tablet; Take 1 tablet by mouth 3 times daily for 30 days. Dispense: 90 tablet; Refill: 0  - methylphenidate (RITALIN) 5 MG tablet; Take 1 tablet by mouth 3 times daily for 30 days. Dispense: 90 tablet; Refill: 0  - methylphenidate (RITALIN) 5 MG tablet; Take 1 tablet by mouth 3 times daily for 30 days. Dispense: 90 tablet; Refill: 0    2. Non-seasonal allergic rhinitis, unspecified trigger  Mild breakthru sx, having some mild side effects with zyrtec with some constipation issues. Monitor with symptomatic tx, as sx are improved  Will have pt try over the counter flonase for persistent or increased sx    3. Mild intermittent asthma without complication  Stable w/o flare    4. Elevated blood pressure reading  blood pressure recheck high normal  Aware risk of HTN  Monitor with diet/exercise and sodium restriction, increase in exercise    5. Reflux esophagitis  Stable with PPI therapy    6. Weight gain  D/t lifestyle issues, and dietary concerns  Resume exercise/diet and will monitor           Follow-up appointment:   3 mos/prn    Discussed use, benefit, and side effects of all prescribed medications. Barriers to medication compliance addressed. All patient questions answered. Pt voiced understanding. When applicable, patient's outside records were reviewed through [facility]. The patient has signed appropriate paperworks/consents.

## 2019-08-19 ENCOUNTER — OFFICE VISIT (OUTPATIENT)
Dept: FAMILY MEDICINE CLINIC | Age: 49
End: 2019-08-19
Payer: COMMERCIAL

## 2019-08-19 VITALS
TEMPERATURE: 97.2 F | HEART RATE: 67 BPM | DIASTOLIC BLOOD PRESSURE: 84 MMHG | HEIGHT: 71 IN | OXYGEN SATURATION: 100 % | SYSTOLIC BLOOD PRESSURE: 132 MMHG | RESPIRATION RATE: 10 BRPM | WEIGHT: 211 LBS | BODY MASS INDEX: 29.54 KG/M2

## 2019-08-19 DIAGNOSIS — R03.0 ELEVATED BLOOD PRESSURE READING: ICD-10-CM

## 2019-08-19 DIAGNOSIS — J30.89 NON-SEASONAL ALLERGIC RHINITIS, UNSPECIFIED TRIGGER: ICD-10-CM

## 2019-08-19 DIAGNOSIS — F98.8 ATTENTION DEFICIT DISORDER, UNSPECIFIED HYPERACTIVITY PRESENCE: ICD-10-CM

## 2019-08-19 DIAGNOSIS — J45.20 MILD INTERMITTENT ASTHMA WITHOUT COMPLICATION: Primary | ICD-10-CM

## 2019-08-19 PROCEDURE — 99214 OFFICE O/P EST MOD 30 MIN: CPT | Performed by: FAMILY MEDICINE

## 2019-08-19 RX ORDER — METHYLPHENIDATE HYDROCHLORIDE 54 MG/1
54 TABLET ORAL EVERY MORNING
Qty: 30 TABLET | Refills: 0 | Status: SHIPPED | OUTPATIENT
Start: 2019-08-19 | End: 2019-11-19 | Stop reason: SDUPTHER

## 2019-08-19 RX ORDER — DICLOFENAC SODIUM 75 MG/1
TABLET, DELAYED RELEASE ORAL
Qty: 60 TABLET | Refills: 5 | Status: SHIPPED | OUTPATIENT
Start: 2019-08-19 | End: 2020-02-18 | Stop reason: SDUPTHER

## 2019-08-19 RX ORDER — METHYLPHENIDATE HYDROCHLORIDE 5 MG/1
5 TABLET ORAL 3 TIMES DAILY
Qty: 90 TABLET | Refills: 0 | Status: SHIPPED | OUTPATIENT
Start: 2019-08-19 | End: 2019-11-19 | Stop reason: SDUPTHER

## 2019-08-19 RX ORDER — METHYLPHENIDATE HYDROCHLORIDE 54 MG/1
54 TABLET ORAL EVERY MORNING
Qty: 30 TABLET | Refills: 0 | Status: SHIPPED | OUTPATIENT
Start: 2019-09-18 | End: 2019-11-19 | Stop reason: SDUPTHER

## 2019-08-19 RX ORDER — METHYLPHENIDATE HYDROCHLORIDE 5 MG/1
5 TABLET ORAL 3 TIMES DAILY
Qty: 90 TABLET | Refills: 0 | Status: SHIPPED | OUTPATIENT
Start: 2019-10-18 | End: 2019-11-19 | Stop reason: SDUPTHER

## 2019-08-19 RX ORDER — METHYLPHENIDATE HYDROCHLORIDE 54 MG/1
54 TABLET ORAL EVERY MORNING
Qty: 30 TABLET | Refills: 0 | Status: SHIPPED | OUTPATIENT
Start: 2019-10-18 | End: 2019-11-19 | Stop reason: SDUPTHER

## 2019-08-19 RX ORDER — METHYLPHENIDATE HYDROCHLORIDE 5 MG/1
5 TABLET ORAL 3 TIMES DAILY
Qty: 90 TABLET | Refills: 0 | Status: SHIPPED | OUTPATIENT
Start: 2019-09-18 | End: 2019-11-19 | Stop reason: SDUPTHER

## 2019-08-19 ASSESSMENT — PATIENT HEALTH QUESTIONNAIRE - PHQ9
SUM OF ALL RESPONSES TO PHQ QUESTIONS 1-9: 0
SUM OF ALL RESPONSES TO PHQ9 QUESTIONS 1 & 2: 0
SUM OF ALL RESPONSES TO PHQ QUESTIONS 1-9: 0
2. FEELING DOWN, DEPRESSED OR HOPELESS: 0
1. LITTLE INTEREST OR PLEASURE IN DOING THINGS: 0

## 2019-11-05 DIAGNOSIS — R03.0 ELEVATED BLOOD PRESSURE READING: ICD-10-CM

## 2019-11-05 LAB
A/G RATIO: 2 (ref 1.1–2.2)
ALBUMIN SERPL-MCNC: 4.8 G/DL (ref 3.4–5)
ALP BLD-CCNC: 77 U/L (ref 40–129)
ALT SERPL-CCNC: 48 U/L (ref 10–40)
ANION GAP SERPL CALCULATED.3IONS-SCNC: 12 MMOL/L (ref 3–16)
AST SERPL-CCNC: 24 U/L (ref 15–37)
BILIRUB SERPL-MCNC: 0.7 MG/DL (ref 0–1)
BUN BLDV-MCNC: 13 MG/DL (ref 7–20)
CALCIUM SERPL-MCNC: 9.5 MG/DL (ref 8.3–10.6)
CHLORIDE BLD-SCNC: 98 MMOL/L (ref 99–110)
CHOLESTEROL, TOTAL: 180 MG/DL (ref 0–199)
CO2: 27 MMOL/L (ref 21–32)
CREAT SERPL-MCNC: 1 MG/DL (ref 0.9–1.3)
GFR AFRICAN AMERICAN: >60
GFR NON-AFRICAN AMERICAN: >60
GLOBULIN: 2.4 G/DL
GLUCOSE BLD-MCNC: 99 MG/DL (ref 70–99)
HCT VFR BLD CALC: 47.1 % (ref 40.5–52.5)
HDLC SERPL-MCNC: 40 MG/DL (ref 40–60)
HEMOGLOBIN: 16 G/DL (ref 13.5–17.5)
LDL CHOLESTEROL CALCULATED: 125 MG/DL
MCH RBC QN AUTO: 28.8 PG (ref 26–34)
MCHC RBC AUTO-ENTMCNC: 34 G/DL (ref 31–36)
MCV RBC AUTO: 84.6 FL (ref 80–100)
PDW BLD-RTO: 13.2 % (ref 12.4–15.4)
PLATELET # BLD: 234 K/UL (ref 135–450)
PMV BLD AUTO: 9.5 FL (ref 5–10.5)
POTASSIUM SERPL-SCNC: 4.8 MMOL/L (ref 3.5–5.1)
RBC # BLD: 5.57 M/UL (ref 4.2–5.9)
SODIUM BLD-SCNC: 137 MMOL/L (ref 136–145)
TOTAL PROTEIN: 7.2 G/DL (ref 6.4–8.2)
TRIGL SERPL-MCNC: 77 MG/DL (ref 0–150)
VLDLC SERPL CALC-MCNC: 15 MG/DL
WBC # BLD: 4 K/UL (ref 4–11)

## 2019-11-06 LAB
ESTIMATED AVERAGE GLUCOSE: 96.8 MG/DL
HBA1C MFR BLD: 5 %

## 2019-11-19 ENCOUNTER — OFFICE VISIT (OUTPATIENT)
Dept: FAMILY MEDICINE CLINIC | Age: 49
End: 2019-11-19
Payer: COMMERCIAL

## 2019-11-19 VITALS
BODY MASS INDEX: 28.87 KG/M2 | SYSTOLIC BLOOD PRESSURE: 136 MMHG | RESPIRATION RATE: 12 BRPM | WEIGHT: 207 LBS | HEART RATE: 74 BPM | DIASTOLIC BLOOD PRESSURE: 86 MMHG | OXYGEN SATURATION: 98 %

## 2019-11-19 DIAGNOSIS — Z79.899 HIGH RISK MEDICATION USE: ICD-10-CM

## 2019-11-19 DIAGNOSIS — J06.9 ACUTE URI: Primary | ICD-10-CM

## 2019-11-19 DIAGNOSIS — F98.8 ATTENTION DEFICIT DISORDER, UNSPECIFIED HYPERACTIVITY PRESENCE: ICD-10-CM

## 2019-11-19 DIAGNOSIS — R03.0 ELEVATED BLOOD PRESSURE READING: ICD-10-CM

## 2019-11-19 DIAGNOSIS — J45.20 MILD INTERMITTENT ASTHMA WITHOUT COMPLICATION: ICD-10-CM

## 2019-11-19 PROCEDURE — 99214 OFFICE O/P EST MOD 30 MIN: CPT | Performed by: FAMILY MEDICINE

## 2019-11-19 RX ORDER — METHYLPHENIDATE HYDROCHLORIDE 5 MG/1
5 TABLET ORAL 3 TIMES DAILY
Qty: 90 TABLET | Refills: 0 | Status: SHIPPED | OUTPATIENT
Start: 2019-12-19 | End: 2020-01-09

## 2019-11-19 RX ORDER — METHYLPHENIDATE HYDROCHLORIDE 54 MG/1
54 TABLET ORAL EVERY MORNING
Qty: 30 TABLET | Refills: 0 | Status: SHIPPED | OUTPATIENT
Start: 2019-12-19 | End: 2020-01-09

## 2019-11-19 RX ORDER — DICLOFENAC SODIUM 75 MG/1
TABLET, DELAYED RELEASE ORAL
Qty: 60 TABLET | Refills: 5 | Status: SHIPPED | OUTPATIENT
Start: 2019-11-19 | End: 2021-07-08 | Stop reason: SDUPTHER

## 2019-11-19 RX ORDER — METHYLPHENIDATE HYDROCHLORIDE 54 MG/1
54 TABLET ORAL EVERY MORNING
Qty: 30 TABLET | Refills: 0 | Status: SHIPPED | OUTPATIENT
Start: 2019-11-19 | End: 2020-01-09

## 2019-11-19 RX ORDER — METHYLPHENIDATE HYDROCHLORIDE 5 MG/1
5 TABLET ORAL 3 TIMES DAILY
Qty: 90 TABLET | Refills: 0 | Status: SHIPPED | OUTPATIENT
Start: 2020-01-18 | End: 2020-01-09

## 2019-11-19 RX ORDER — METHYLPHENIDATE HYDROCHLORIDE 54 MG/1
54 TABLET ORAL EVERY MORNING
Qty: 30 TABLET | Refills: 0 | Status: SHIPPED | OUTPATIENT
Start: 2020-01-18 | End: 2020-01-09

## 2019-11-19 RX ORDER — METHYLPHENIDATE HYDROCHLORIDE 5 MG/1
5 TABLET ORAL 3 TIMES DAILY
Qty: 90 TABLET | Refills: 0 | Status: SHIPPED | OUTPATIENT
Start: 2019-11-19 | End: 2020-01-09

## 2019-12-31 RX ORDER — PANTOPRAZOLE SODIUM 40 MG/1
40 TABLET, DELAYED RELEASE ORAL DAILY
Qty: 30 TABLET | Refills: 5 | Status: SHIPPED | OUTPATIENT
Start: 2019-12-31 | End: 2020-06-04 | Stop reason: SDUPTHER

## 2020-01-02 ENCOUNTER — TELEPHONE (OUTPATIENT)
Dept: FAMILY MEDICINE CLINIC | Age: 50
End: 2020-01-02

## 2020-01-09 ENCOUNTER — OFFICE VISIT (OUTPATIENT)
Dept: FAMILY MEDICINE CLINIC | Age: 50
End: 2020-01-09
Payer: COMMERCIAL

## 2020-01-09 ENCOUNTER — CLINICAL DOCUMENTATION (OUTPATIENT)
Dept: PSYCHOLOGY | Age: 50
End: 2020-01-09

## 2020-01-09 ENCOUNTER — OFFICE VISIT (OUTPATIENT)
Dept: PSYCHOLOGY | Age: 50
End: 2020-01-09
Payer: COMMERCIAL

## 2020-01-09 VITALS
WEIGHT: 212 LBS | RESPIRATION RATE: 14 BRPM | TEMPERATURE: 98.4 F | SYSTOLIC BLOOD PRESSURE: 137 MMHG | DIASTOLIC BLOOD PRESSURE: 90 MMHG | BODY MASS INDEX: 29.57 KG/M2 | OXYGEN SATURATION: 98 % | HEART RATE: 71 BPM

## 2020-01-09 PROBLEM — F41.8 DEPRESSION WITH ANXIETY: Status: ACTIVE | Noted: 2020-01-09

## 2020-01-09 PROCEDURE — 90791 PSYCH DIAGNOSTIC EVALUATION: CPT | Performed by: PSYCHOLOGIST

## 2020-01-09 PROCEDURE — 99214 OFFICE O/P EST MOD 30 MIN: CPT | Performed by: FAMILY MEDICINE

## 2020-01-09 RX ORDER — METHYLPHENIDATE HYDROCHLORIDE 10 MG/1
10 TABLET ORAL 3 TIMES DAILY
Qty: 90 TABLET | Refills: 0 | Status: SHIPPED | OUTPATIENT
Start: 2020-01-09 | End: 2020-02-18 | Stop reason: SDUPTHER

## 2020-01-09 ASSESSMENT — PATIENT HEALTH QUESTIONNAIRE - PHQ9
4. FEELING TIRED OR HAVING LITTLE ENERGY: 0
3. TROUBLE FALLING OR STAYING ASLEEP: 0
1. LITTLE INTEREST OR PLEASURE IN DOING THINGS: 1
9. THOUGHTS THAT YOU WOULD BE BETTER OFF DEAD, OR OF HURTING YOURSELF: 0
SUM OF ALL RESPONSES TO PHQ QUESTIONS 1-9: 4
SUM OF ALL RESPONSES TO PHQ QUESTIONS 1-9: 2
5. POOR APPETITE OR OVEREATING: 2
8. MOVING OR SPEAKING SO SLOWLY THAT OTHER PEOPLE COULD HAVE NOTICED. OR THE OPPOSITE, BEING SO FIGETY OR RESTLESS THAT YOU HAVE BEEN MOVING AROUND A LOT MORE THAN USUAL: 0
SUM OF ALL RESPONSES TO PHQ QUESTIONS 1-9: 4
SUM OF ALL RESPONSES TO PHQ QUESTIONS 1-9: 2
7. TROUBLE CONCENTRATING ON THINGS, SUCH AS READING THE NEWSPAPER OR WATCHING TELEVISION: 0
SUM OF ALL RESPONSES TO PHQ9 QUESTIONS 1 & 2: 2
2. FEELING DOWN, DEPRESSED OR HOPELESS: 1
6. FEELING BAD ABOUT YOURSELF - OR THAT YOU ARE A FAILURE OR HAVE LET YOURSELF OR YOUR FAMILY DOWN: 0
2. FEELING DOWN, DEPRESSED OR HOPELESS: 1
SUM OF ALL RESPONSES TO PHQ9 QUESTIONS 1 & 2: 2
1. LITTLE INTEREST OR PLEASURE IN DOING THINGS: 1

## 2020-01-09 ASSESSMENT — ANXIETY QUESTIONNAIRES
6. BECOMING EASILY ANNOYED OR IRRITABLE: 1-SEVERAL DAYS
2. NOT BEING ABLE TO STOP OR CONTROL WORRYING: 0-NOT AT ALL
GAD7 TOTAL SCORE: 3
7. FEELING AFRAID AS IF SOMETHING AWFUL MIGHT HAPPEN: 0-NOT AT ALL
1. FEELING NERVOUS, ANXIOUS, OR ON EDGE: 1-SEVERAL DAYS
3. WORRYING TOO MUCH ABOUT DIFFERENT THINGS: 0-NOT AT ALL
4. TROUBLE RELAXING: 1-SEVERAL DAYS
5. BEING SO RESTLESS THAT IT IS HARD TO SIT STILL: 0-NOT AT ALL

## 2020-01-09 NOTE — PATIENT INSTRUCTIONS
Rash   Urination   Neck pain      Tingling hands/feet     Emotional and Thought Responses   Restlessness   Agitation   Insecurity            Worthlessness   Anxiety   Stress    Depression             Hopelessness   Guilt    Defensiveness  Anger            Racing thoughts   Nightmares   Intense thinking  Sensitivity           Expecting the worst   Numbness   Lack of motivation  Mood swings             Forgetfulness   ? Concentration  Rigidity              Preoccupation  Intolerance     Behavioral Responses   Avoidance   Withdrawal   Neglect   ? Alcohol use   Smoking   ? Eating   Arguing        Poor appearance   ? Spending   Poor hygiene   ? Eating   Seeking reassurance   Nail biting   Skin picking   ? Talking         ? Body checking   Sexual problems  Foot tapping          Seeking information ? Exercise   Teeth clenching          Multitasking      Aggressive speaking  ? Fun activities    ? Sleeping        ? Relaxing activities    Fidgeting Rapid walking    The parasympathetic nervous system in your body is designed to turn on your bodys relaxation response, which is why it is sometimes called your \"rest and relax\" system. Your behaviors and thinking can keep your bodys natural relaxation response from operating at its best, especially when we're under stress. Getting your body to relax on a daily basis for at least brief periods can help decrease unpleasant stress responses. Learning to relax your body, through specific breathing and relaxation exercises and by minimizing stressful thinking, can help your bodys natural relaxation system become more effective. Diaphragmatic Breathing    \"The entire autonomic nervous system (and through it, our internal organs and glands) is largely driven by our breathing patterns.  By changing our breathing we can influence millions of biochemical reactions in our body, producing more relaxing substances such as endorphins and fewer anxiety-producing ones like adrenaline and higher blood acidity. Mindfulness of the breath is so effective that it is common to all meditative and prayer traditions. \" Anxiety Fear & Breathing - Breathing. com    \"When overcoming high levels of anxiety, it is important to learn the techniques of correct breathing. Many people who live with high levels of anxiety are known to breathe through their chest. Shallow breathing through the chest means you are disrupting the balance of oxygen and carbon dioxide necessary to be in a relaxed state. This type of breathing will perpetuate the symptoms of anxiety. \" [employer]. com      What Is Diaphragmatic Breathing? Diaphragmatic breathing is a technique that helps you slow down your breathing when feeling stressed or anxious by using your diaphragm muscle to bring about a state of physiological relaxation. [place] babies naturally breathe this way, and singers, wind instrument players, and yoga practitioners also use this type of breathing. The diaphragm is a large muscle that rests across the bottom of your rib cage. When you inhale, the diaphragm muscle drops, opening up space so air can come in. When watching someone do this, it looks like your stomach is filling with air. This type of breathing helps activate the part of your nervous system that controls relaxation. It can lead to decreased heart rate, blood pressure, decreased muscle tension, and overall feelings of relaxation. Why Is Diaphragmatic Breathing Important? ? Our breathing changes when we are feeling anxious. We tend to take short,  quick, shallow breaths, or even hyperventilate; this is called overbreathing.    ? It is a good idea to learn techniques for managing overbreathing, because this  type of breathing can actually make you feel even more anxious!    ? Diaphragmatic breathing is a great portable tool that you can use whenever you are feeling anxious. However, it does require some practice.     Key point: Like other anxiety-management skills, the purpose of calm  breathing is not to avoid anxiety at all costs, but just to take the edge off or  help you ride out the feelings. Why Be Concerned With How Im Breathing?  To increase your awareness of the role that breathing plays in physical tension and your bodys stress response.  To lower your level of stress-related arousal and tension.  To give you a method of taking calm, relaxing breaths to break the cycle of increasing arousal during stressful situations. What Is the Best Way To Use Diaphragmatic Breathing Exercises?  Use diaphragmatic breathing frequently.  Take deep breaths at the first signs of stress, anxiety, physical tension, or other symptoms.  Schedule time for relaxation. How to Do It  Diaphragmatic breathing involves taking smooth, slow, and regular breaths. Sitting upright can increase the capacity of your lungs to fill with air. It is best to 'take the weight' off your shoulders by supporting your arms on the side-arms of a chair, or on your lap. You may also choose to practice breathing while lying down as well. 1. Take a slow breath in through the nose, breathing into your lower belly (for about 4 seconds)   2. Exhale slowly through the mouth (for about 7-8 seconds)     About 6-8 breathing cycles per minute is often helpful to decrease anxiety, but find your  own comfortable breathing rhythm. These cycles regulate the amount of oxygen you  take in so that you do not experience the fainting, tingling, and giddy sensations that are  sometimes associated with overbreathing. Helpful Hints  Make sure that you arent hyperventitating; it is important to pause for a second or two after each breath. Try to breathe from your diaphragm or abdomen. Your shoulders and chest area  should be fairly relaxed and still.  If this is challenging at first, it can be helpful to  first try this exercise by lying down on the floor with

## 2020-01-09 NOTE — PROGRESS NOTES
Here for f/u of mood issues. Pt states that in the past few weeks, has noted a sig increase in depression. Pt states that there has been some significant stressors currently, and over the holidays felt that his mood was 'flat'. Pt feels that his energy level was low, and mood has been lower. Pt feels that things have been gradually worsening, but with some stressors in the family, both financial issues and some stress with his kids, does increase his anxiety/stress. Younger daughter had lice and that was stressful. Pt has had episodes that he would consider panic/anxiety. On 12/30, developed some increase anxiety and had a panic attack. Pt felt nausea, malaise, headache. Pt states he started to cry to 'flush out the tension'. Pt did continue to take the concerta at 90MX. Pt does feel that things have improved, and feels that with stopping long-acting and then transitioning to 10mg of the short-acting TID and that has been helpful to him      Except as noted above in the history of present illness, the review of systems is  negative for headache, vision changes, chest pain, shortness of breath, abdominal pain, urinary sx, bowel changes. Past medical, surgical, and social history reviewed and updated  Medications and allergies reviewed and updated         O: BP (!) 137/90   Pulse 71   Temp 98.4 °F (36.9 °C) (Oral)   Resp 14   Wt 212 lb (96.2 kg)   SpO2 98%   BMI 29.57 kg/m²   GEN: No acute distress, cooperative, well nourished, alert. HEENT: PEERLA, EOMI , normocephalic/atraumatic, nares and oropharynx clear. Mucous membranes normal, Tympanic membranes clear bilaterally. Neck: soft, supple, no thyromegaly, mass, no Lymphadenopathy  CV: Regular rate and rhythm, no murmur, rubs, gallops. No edema. Resp: Clear to auscultation bilaterally good air entry bilaterally  No crackles, wheeze. Breathing comfortably. Psych: mild to moderate dysthymia, No suicidal thoughts or ideation  Mild anxiety. Very talkative, wordy      ASSESSMENT / PLAN:    1. Attention deficit disorder, unspecified hyperactivity presence  D/c long acting stimulant therapy and change to ritalin 10mg TID  refills given as below  Monitor with tx mood as below  - methylphenidate (RITALIN) 10 MG tablet; Take 1 tablet by mouth 3 times daily for 30 days. Dispense: 90 tablet; Refill: 0    2. High risk medication use  Side effects of stimulant medications discussed, including anxiety, irritability , decreased appetite , weight loss, hypertension, palpitations. Patient aware of addiction potential and need to  medication prescriptions at office at time of appointment. 3. Stress  Moderate sx, improved at this time but with some persistent stressors/issues  Discussed tx options. Will refer to counseling/therapy  Warm handoff given to dr. Ammy Toribio today  Consider SSRI therapy, will consider and f/u 2-3wk    4. Depression with anxiety  As above           Follow-up appointment:   3 wk/prn    Discussed use, benefit, and side effects of all prescribed medications. Barriers to medication compliance addressed. All patient questions answered. Pt voiced understanding. When applicable, [facility]'s [facility]. The patient has signed appropriate paperworks/consents.

## 2020-01-09 NOTE — PROGRESS NOTES
At PCP's request, this writer provided a warm hand-off to introduce myself and provide information to the patient about [facility] services. Patient's wife was present during the warm handoff. Patient chose to schedule a new patient visit for later today.

## 2020-01-09 NOTE — PROGRESS NOTES
Behavioral Health Consultation  Jennifer Ashley Psy.D. Psychologist  2020  4-4:35 PM      Time spent with Patient: 35 minutes  This is patient's first [facility] appointment. Reason for Consult: Anxiety, depression, stress  Referring Provider: MD Francesco Ram [address] [place] [address]    Pt provided informed consent for the behavioral health program. Discussed with patient the model of service, including the limits of confidentiality (e.g., abuse reporting, suicide intervention) and the nature of the [facility] approach (e.g., focused, targeted interventions; open communication with PCP). Pt indicated understanding. Feedback given to PCP. S:  Pt reported that on  he felt sick to his stomach at work and very emotional without clear trigger. Felt anxious, needed his wife nearby at all times for a few days. Usually feels happy, views life in a positive way, but he struggled with sadness around that time. No hx of depression. Mother  when pt was 3. Pt was one of 6 child raised by their father with aunt's help. Pt is , has 2 daughters, ages 25 (going to college soon) and 5. Pt feels like no one is listening lately around the house. Wife manages the finances. Dealing with financial stress with daughter leaving soon for college. Losing the family's home d/t mortgage issues but the kids are not yet aware of this. Wife has been very stressed from her job over the last year, which affects pt. 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 A past PCP put pt on Invega but pt cannot recall why. Dr. Miller Ear took pt off the medication when he started treating pt 10 years ago. Pt takes Concerta XR and a shorter action version for ADHD. Sleep: Never slept well since childhood. Night person. Catches up on the weekends. Nutrition: The family doesn't eat well.   Exercise: Used to run at work but less often since his best friend started inviting pt to Process: logical, coherent, tangential and circumstantial  Insight: fair  Judgment: intact  Ability to understand instructions: Yes  Ability to respond meaningfully: Yes  Morbid Ideation: no   Suicide Assessment: no suicidal ideation, plan, or intent  Homicidal Ideation: no      TRENT 7 SCORE 1/9/2020   TRENT-7 Total Score 3     Interpretation of TRENT-7 score: 5-9 = mild anxiety, 10-14 = moderate anxiety, 15+ = severe anxiety. Recommend referral to behavioral health for scores 10 or greater. PHQ Scores 1/9/2020 1/9/2020 8/19/2019 7/9/2018 4/10/2017   PHQ2 Score 2 2 0 0 0   PHQ9 Score 4 2 0 0 0     Interpretation of Total Score Depression Severity: 1-4 = Minimal depression, 5-9 = Mild depression, 10-14 = Moderate depression, 15-19 = Moderately severe depression, 20-27 = Severe depression    Diagnosis:    1. Adjustment disorder with mixed anxiety and depressed mood    2. Attention deficit hyperactivity disorder (ADHD), unspecified ADHD type        Patient Active Problem List   Diagnosis    Reflux esophagitis    Allergic rhinitis    Chronic polyarticular juvenile rheumatoid arthritis ([facility] [address]    Attention deficit disorder    Asthma    Chronic insomnia    Chronic bilateral low back pain without sciatica    Depression with anxiety         Plan:  Pt interventions:  Established rapport, Moran-setting to identify pt's primary goals for [facility] visit / overall health, Supportive techniques, Provided Psychoeducation re: Anxiety and mindfulness, Emphasized self-care as important for managing overall health, Provided handout on Anxiety and diaphragmatic breathing, CBT to target Balanced thinking, Discussed and set plan for behavioral activation and Trained in relaxation strategies including Diaphragmatic breathing. Pt Behavioral Change Plan:  Pt set the following goals:  1. Run on Mondays and Fridays, and play volleyball on Wednesdays for stress management  2.  Practice being mindful - paying attention to the present

## 2020-01-27 ENCOUNTER — OFFICE VISIT (OUTPATIENT)
Dept: PSYCHOLOGY | Age: 50
End: 2020-01-27
Payer: COMMERCIAL

## 2020-01-27 PROCEDURE — 90832 PSYTX W PT 30 MINUTES: CPT | Performed by: PSYCHOLOGIST

## 2020-01-27 ASSESSMENT — PATIENT HEALTH QUESTIONNAIRE - PHQ9
SUM OF ALL RESPONSES TO PHQ QUESTIONS 1-9: 0
1. LITTLE INTEREST OR PLEASURE IN DOING THINGS: 0
SUM OF ALL RESPONSES TO PHQ QUESTIONS 1-9: 0
SUM OF ALL RESPONSES TO PHQ9 QUESTIONS 1 & 2: 0
4. FEELING TIRED OR HAVING LITTLE ENERGY: 0
3. TROUBLE FALLING OR STAYING ASLEEP: 0
2. FEELING DOWN, DEPRESSED OR HOPELESS: 0
9. THOUGHTS THAT YOU WOULD BE BETTER OFF DEAD, OR OF HURTING YOURSELF: 0
7. TROUBLE CONCENTRATING ON THINGS, SUCH AS READING THE NEWSPAPER OR WATCHING TELEVISION: 0
8. MOVING OR SPEAKING SO SLOWLY THAT OTHER PEOPLE COULD HAVE NOTICED. OR THE OPPOSITE, BEING SO FIGETY OR RESTLESS THAT YOU HAVE BEEN MOVING AROUND A LOT MORE THAN USUAL: 0
6. FEELING BAD ABOUT YOURSELF - OR THAT YOU ARE A FAILURE OR HAVE LET YOURSELF OR YOUR FAMILY DOWN: 0
5. POOR APPETITE OR OVEREATING: 0

## 2020-01-27 ASSESSMENT — ANXIETY QUESTIONNAIRES
3. WORRYING TOO MUCH ABOUT DIFFERENT THINGS: 0-NOT AT ALL
6. BECOMING EASILY ANNOYED OR IRRITABLE: 0-NOT AT ALL
GAD7 TOTAL SCORE: 1
7. FEELING AFRAID AS IF SOMETHING AWFUL MIGHT HAPPEN: 0-NOT AT ALL
4. TROUBLE RELAXING: 0-NOT AT ALL
5. BEING SO RESTLESS THAT IT IS HARD TO SIT STILL: 0-NOT AT ALL
1. FEELING NERVOUS, ANXIOUS, OR ON EDGE: 1-SEVERAL DAYS
2. NOT BEING ABLE TO STOP OR CONTROL WORRYING: 0-NOT AT ALL

## 2020-01-27 NOTE — PROGRESS NOTES
Behavioral Health Consultation  Junito Caro Psy.D. Psychologist  1/27/2020  1:30-1:55 PM      Time spent with Patient: 25 minutes  This is patient's second [facility] appointment. Reason for Consult: Anxiety, depression, stress  Referring Provider: MD Francesco Lara [facility]      S:  Pt reported that he's felt normal, has not had any problems since last visit. Talking openly with his wife about all of the transitions they have going on. Patient will take over management of family finances. Believes he was overwhelmed over the holidays for a variety of reasons, including family illnesses. Back to working out at lunchtime. Uses breathing on occasion when he needs it. When he feels overwhelmed by info, needs to write things down to clear his head, which helps. O:  Patient's distress has decreased since last visit, and he reported feeling back to his normal self. Reinforced his efforts towards self-care, including regular exercise and use of diaphragmatic breathing. Discussed the potential benefits of writing down his thoughts during stressful moments, which he agreed can be helpful. Encouraged continued open communication with his wife. No safety concerns at this time. Patient feels ready to move to PRN visits.       A:  MSE:  Appearance: good hygiene  and appropriate attire  Attitude: cooperative, friendly and no distress  Consciousness: alert  Orientation: oriented to person, place, time, general circumstance  Memory: recent and remote memory intact  Attention/Concentration: intact during session  Psychomotor Activity: normal  Eye Contact: normal  Speech: normal rate and volume, well-articulated  Mood: euthymic  Affect: congruent  Perception: within normal limits  Thought Content: within normal limits  Thought Process: logical, coherent, tangential and circumstantial  Insight: good  Judgment: intact  Ability to understand instructions: Yes  Ability to respond meaningfully: Yes  Morbid Ideation: no   Suicide Assessment: no suicidal ideation, plan, or intent  Homicidal Ideation: no      TRENT 7 SCORE 1/27/2020 1/9/2020   TRENT-7 Total Score 1 3     Interpretation of TRENT-7 score: 5-9 = mild anxiety, 10-14 = moderate anxiety, 15+ = severe anxiety. Recommend referral to behavioral health for scores 10 or greater. PHQ Scores 1/27/2020 1/9/2020 1/9/2020 8/19/2019 7/9/2018 4/10/2017   PHQ2 Score 0 2 2 0 0 0   PHQ9 Score 0 4 2 0 0 0     Interpretation of Total Score Depression Severity: 1-4 = Minimal depression, 5-9 = Mild depression, 10-14 = Moderate depression, 15-19 = Moderately severe depression, 20-27 = Severe depression    Diagnosis:    1. Adjustment disorder with mixed anxiety and depressed mood    2. Attention deficit hyperactivity disorder (ADHD), unspecified ADHD type        Patient Active Problem List   Diagnosis    Reflux esophagitis    Allergic rhinitis    Chronic polyarticular juvenile rheumatoid arthritis ([facility] [address]    Attention deficit disorder    Asthma    Chronic insomnia    Chronic bilateral low back pain without sciatica    Depression with anxiety         Plan:  Pt interventions:  Supportive techniques, Provided Psychoeducation re: journaling, Emphasized self-care as important for managing overall health and CBT to target balanced thinking. Pt Behavioral Change Plan:  Pt set the following goals:  1. Run on Mondays and Fridays, and play volleyball on Wednesdays for stress management  2. Practice being mindful - paying attention to the present moment on purpose and in a nonjudgmental way  3. Practice diaphragmatic breathing throughout the day  4. Write down your thoughts to help you process them    Pt may call to schedule follow-up visits as needed in the future.

## 2020-02-04 ENCOUNTER — TELEPHONE (OUTPATIENT)
Dept: FAMILY MEDICINE CLINIC | Age: 50
End: 2020-02-04

## 2020-02-05 NOTE — TELEPHONE ENCOUNTER
PA submitted via [facility] for Methylphenidate HCl 10MG tablets. Rosalie Ortiz - PA Case ID: [id number]    APPROVED today from 02/05/2020 through 02/04/2023. Please advise patient. Thank you.

## 2020-02-18 ENCOUNTER — OFFICE VISIT (OUTPATIENT)
Dept: FAMILY MEDICINE CLINIC | Age: 50
End: 2020-02-18
Payer: COMMERCIAL

## 2020-02-18 VITALS
TEMPERATURE: 98.3 F | WEIGHT: 214 LBS | HEART RATE: 67 BPM | SYSTOLIC BLOOD PRESSURE: 128 MMHG | OXYGEN SATURATION: 97 % | BODY MASS INDEX: 29.85 KG/M2 | DIASTOLIC BLOOD PRESSURE: 80 MMHG | RESPIRATION RATE: 12 BRPM

## 2020-02-18 PROCEDURE — 99214 OFFICE O/P EST MOD 30 MIN: CPT | Performed by: FAMILY MEDICINE

## 2020-02-18 RX ORDER — METHYLPHENIDATE HYDROCHLORIDE 10 MG/1
10 TABLET ORAL 3 TIMES DAILY
Qty: 90 TABLET | Refills: 0 | Status: SHIPPED | OUTPATIENT
Start: 2020-04-18 | End: 2020-06-04 | Stop reason: SDUPTHER

## 2020-02-18 RX ORDER — METHYLPHENIDATE HYDROCHLORIDE 10 MG/1
10 TABLET ORAL 3 TIMES DAILY
Qty: 90 TABLET | Refills: 0 | Status: SHIPPED | OUTPATIENT
Start: 2020-02-18 | End: 2020-06-04 | Stop reason: SDUPTHER

## 2020-02-18 RX ORDER — DICLOFENAC SODIUM 75 MG/1
TABLET, DELAYED RELEASE ORAL
Qty: 60 TABLET | Refills: 5 | Status: SHIPPED | OUTPATIENT
Start: 2020-02-18 | End: 2021-01-11

## 2020-02-18 RX ORDER — METHYLPHENIDATE HYDROCHLORIDE 10 MG/1
10 TABLET ORAL 3 TIMES DAILY
Qty: 90 TABLET | Refills: 0 | Status: SHIPPED | OUTPATIENT
Start: 2020-03-19 | End: 2020-06-04 | Stop reason: SDUPTHER

## 2020-02-18 NOTE — PROGRESS NOTES
Here for f/u and recheck of meds, mood. Pt states that things have been doing well, feels that his mood, anxiety sx have been much better. Pt has been doing some journaling and did get in to see Dr. Nayan Shankar for evaluation, has seen her twice and did find it beneficial.  Pt felt counseling was helpful. Pt does not have any follow up appointments with her at this time, wants to monitor with supportive/symptomatic therapy. Here for follow up of ADD. Pt states that they are doing very well with current therapy and feels that control of symptoms are adequate at this time. No breakthru symptoms and no need/indication for adjustment in therapy. Taking meds as prescribed and denies any illicit medication usage of misuse of their stimulant thearpy. Mood is stable and denies any issues of depression or anxiety associated with treatment of ADD. Pt is happy with the benefit of the med, taking ritalin 10mg TID and being off long-acting meds has done ok. Pt does take TID regularly. Energy level seems stable, controlled at this time. Pt here for follow up of blood pressure. Pt states doing great with adherence to therapy and feels well. No issues of chest pain, shortness of breath. No vision changes, headache, swelling in legs. Pt is doing well to stay active and is working on improving lifestyle, diet/exercise. Except as noted above in the history of present illness, the review of systems is  negative for headache, vision changes, chest pain, shortness of breath, abdominal pain, urinary sx, bowel changes. Past medical, surgical, and social history reviewed and updated  Medications and allergies reviewed and updated         O: /80   Pulse 67   Temp 98.3 °F (36.8 °C) (Oral)   Resp 12   Wt 214 lb (97.1 kg)   SpO2 97%   BMI 29.85 kg/m²   GEN: No acute distress, cooperative, well nourished, alert. HEENT: PEERLA, EOMI , normocephalic/atraumatic, nares and oropharynx clear.   Mucous 0    2. High risk medication use  Side effects of stimulant medications discussed, including anxiety, irritability , decreased appetite , weight loss, hypertension, palpitations. Patient aware of addiction potential and need to  medication prescriptions at office at time of appointment. 3. Depression with anxiety  Mood stable, cont supportive therapy   Did see psychology and felt helpful, can f/u prn    4. Stress  Doing better, cont supportive therapy    5. Elevated blood pressure reading  Recheck of blood pressure controlled, normal  Cont lifestyle mgt/diet/exercise    6. Visual changes       Follow-up appointment:   3 mos/prn    Discussed use, benefit, and side effects of all prescribed medications. Barriers to medication compliance addressed. All patient questions answered. Pt voiced understanding. When applicable, patient's outside records were reviewed through [facility]. The patient has signed appropriate paperworks/consents.

## 2020-04-06 ENCOUNTER — PATIENT MESSAGE (OUTPATIENT)
Dept: FAMILY MEDICINE CLINIC | Age: 50
End: 2020-04-06

## 2020-04-07 NOTE — TELEPHONE ENCOUNTER
From: Tony Velázquez  To: Mark Anthony Cunningham MD  Sent: 4/6/2020  6:08 PM EDT  Subject: Prescription Question    Hello,  My daughter is being treated for strep throat. Due to a fever I had when I was young I'm supposed to notify the doctor when I'm exposed to strep. Would it be possible for Dr. Dianelys Hull to write a prescription for an antibiotic for me, or should I schedule an appointment?   Thank you,  Juan Diego Burgos

## 2020-06-04 ENCOUNTER — VIRTUAL VISIT (OUTPATIENT)
Dept: FAMILY MEDICINE CLINIC | Age: 50
End: 2020-06-04
Payer: COMMERCIAL

## 2020-06-04 VITALS — WEIGHT: 195 LBS | HEART RATE: 72 BPM | BODY MASS INDEX: 27.2 KG/M2 | TEMPERATURE: 96.8 F

## 2020-06-04 PROCEDURE — 99214 OFFICE O/P EST MOD 30 MIN: CPT | Performed by: FAMILY MEDICINE

## 2020-06-04 RX ORDER — PANTOPRAZOLE SODIUM 40 MG/1
40 TABLET, DELAYED RELEASE ORAL DAILY
Qty: 30 TABLET | Refills: 5 | Status: SHIPPED | OUTPATIENT
Start: 2020-06-04 | End: 2020-07-06

## 2020-06-04 RX ORDER — METHYLPHENIDATE HYDROCHLORIDE 10 MG/1
10 TABLET ORAL 3 TIMES DAILY
Qty: 90 TABLET | Refills: 0 | Status: SHIPPED | OUTPATIENT
Start: 2020-08-03 | End: 2020-10-20

## 2020-06-04 RX ORDER — METHYLPHENIDATE HYDROCHLORIDE 10 MG/1
10 TABLET ORAL 3 TIMES DAILY
Qty: 90 TABLET | Refills: 0 | Status: SHIPPED | OUTPATIENT
Start: 2020-06-04 | End: 2020-10-20

## 2020-06-04 RX ORDER — METHYLPHENIDATE HYDROCHLORIDE 10 MG/1
10 TABLET ORAL 3 TIMES DAILY
Qty: 90 TABLET | Refills: 0 | Status: SHIPPED | OUTPATIENT
Start: 2020-07-04 | End: 2020-10-20

## 2020-06-04 NOTE — PROGRESS NOTES
Here for virtual visit and f/u of ADD, mood    Pt states that things have been doing ok overall, has been a lot stressful with COVID pandemic. Pt did move recently, had to do it all himself as things were on lockdown. Pt did it on their own but did it slowly and was able to get things taken care of. They moved to [place], and is very close to work. Pt has been working from home mostly, and in the past few weeks has gone back to split even vs odd days. Here for follow up of ADD. Pt states that they are doing very well with current therapy and feels that control of symptoms are adequate at this time. No breakthru symptoms and no need/indication for adjustment in therapy. Taking meds as prescribed and denies any illicit medication usage of misuse of their stimulant thearpy. Mood is stable and denies any issues of depression or anxiety associated with treatment of ADD. Breathing has been stable and is managing well w/o issues. No flare despite weather changes    6/4/2020    TELEHEALTH EVALUATION -- Audio/Visual (During FEQEV-82 public health emergency)      Due to COVID 19 outbreak, patient's office visit was converted to a virtual visit. Patient was contacted and agreed to proceed with a virtual visit via [address] Visit  The risks and benefits of converting to a virtual visit were discussed in light of the current infectious disease epidemic. Patient also understood that insurance coverage and co-pays are up to their individual insurance plans. As above    Review of Systems  Except as noted above in the history of present illness, the review of systems is  negative for headache, vision changes, chest pain, shortness of breath, abdominal pain, urinary sx, bowel changes.     Past medical, surgical, and social history reviewed and updated  Medications and allergies reviewed and updated        Social History     Tobacco Use    Smoking status: Never Smoker    Smokeless tobacco: Never Used through [facility]. The patient has signed appropriate paperworks/consents. An  electronic signature was used to authenticate this note. --Luigi Seals MD on 6/4/2020 at 10:48 AM      Pursuant to the emergency declaration under the [address] [place] [address], [facility] waiver authority and the [employer] Act, this Virtual  Visit was conducted, with patient's consent, to reduce the patient's risk of exposure to COVID-19 and provide continuity of care for an established patient. Services were provided through a video synchronous discussion virtually to substitute for in-person clinic visit.

## 2020-10-20 ENCOUNTER — OFFICE VISIT (OUTPATIENT)
Dept: FAMILY MEDICINE CLINIC | Age: 50
End: 2020-10-20
Payer: COMMERCIAL

## 2020-10-20 VITALS
HEIGHT: 71 IN | OXYGEN SATURATION: 97 % | TEMPERATURE: 97.3 F | BODY MASS INDEX: 29.61 KG/M2 | DIASTOLIC BLOOD PRESSURE: 83 MMHG | SYSTOLIC BLOOD PRESSURE: 120 MMHG | WEIGHT: 211.5 LBS | RESPIRATION RATE: 19 BRPM | HEART RATE: 77 BPM

## 2020-10-20 PROCEDURE — 99214 OFFICE O/P EST MOD 30 MIN: CPT | Performed by: FAMILY MEDICINE

## 2020-10-20 PROCEDURE — 90471 IMMUNIZATION ADMIN: CPT | Performed by: FAMILY MEDICINE

## 2020-10-20 PROCEDURE — 90732 PPSV23 VACC 2 YRS+ SUBQ/IM: CPT | Performed by: FAMILY MEDICINE

## 2020-10-20 PROCEDURE — 90472 IMMUNIZATION ADMIN EACH ADD: CPT | Performed by: FAMILY MEDICINE

## 2020-10-20 PROCEDURE — 90715 TDAP VACCINE 7 YRS/> IM: CPT | Performed by: FAMILY MEDICINE

## 2020-10-20 RX ORDER — METHYLPHENIDATE HYDROCHLORIDE 54 MG/1
54 TABLET ORAL EVERY MORNING
Qty: 30 TABLET | Refills: 0 | Status: SHIPPED | OUTPATIENT
Start: 2020-12-19 | End: 2021-01-26 | Stop reason: SDUPTHER

## 2020-10-20 RX ORDER — METHYLPHENIDATE HYDROCHLORIDE 5 MG/1
5 TABLET ORAL 3 TIMES DAILY
Qty: 90 TABLET | Refills: 0 | Status: SHIPPED | OUTPATIENT
Start: 2020-11-19 | End: 2021-01-26 | Stop reason: SDUPTHER

## 2020-10-20 RX ORDER — METHYLPHENIDATE HYDROCHLORIDE 54 MG/1
54 TABLET ORAL EVERY MORNING
Qty: 30 TABLET | Refills: 0 | Status: SHIPPED | OUTPATIENT
Start: 2020-10-20 | End: 2021-01-22 | Stop reason: SDUPTHER

## 2020-10-20 RX ORDER — METHYLPHENIDATE HYDROCHLORIDE 54 MG/1
54 TABLET ORAL EVERY MORNING
Qty: 30 TABLET | Refills: 0 | Status: SHIPPED | OUTPATIENT
Start: 2020-11-19 | End: 2021-01-26

## 2020-10-20 RX ORDER — METHYLPHENIDATE HYDROCHLORIDE 10 MG/1
10 TABLET ORAL 3 TIMES DAILY
Qty: 90 TABLET | Refills: 0 | Status: CANCELLED | OUTPATIENT
Start: 2020-10-20 | End: 2020-11-19

## 2020-10-20 RX ORDER — METHYLPHENIDATE HYDROCHLORIDE 5 MG/1
5 TABLET ORAL 3 TIMES DAILY
Qty: 90 TABLET | Refills: 0 | Status: SHIPPED | OUTPATIENT
Start: 2020-12-19 | End: 2021-01-26 | Stop reason: SDUPTHER

## 2020-10-20 RX ORDER — METHYLPHENIDATE HYDROCHLORIDE 5 MG/1
5 TABLET ORAL 3 TIMES DAILY
Qty: 90 TABLET | Refills: 0 | Status: SHIPPED | OUTPATIENT
Start: 2020-10-20 | End: 2021-01-26 | Stop reason: SDUPTHER

## 2020-10-20 NOTE — PROGRESS NOTES
Here for f/u and recheck and f/u of mood, ADD    Pt states things are doing ok, feels that things are doing well at this time  Pt states that his stress/mood has been doing well. Pt had some increased issues related to a recent move to [place] [facility], and feels that he is much happier in his new house with much less stress. Mood did flare with move, but things have calmed down a bit. There was moderate stressors related to the financials of the move, and with their old house, but things have calmed down significantly. Pt staying in mostly and managing COVID pandemic stressors well. Daughter is back at school full time    Here for follow up of ADD. Pt states that they are doing very well with current therapy and feels that control of symptoms are adequate at this time. No breakthru symptoms and no need/indication for adjustment in therapy. Taking meds as prescribed and denies any illicit medication usage of misuse of their stimulant thearpy. Mood is stable and denies any issues of depression or anxiety associated with treatment of ADD. Pt does feel that there is moderate sensitivity to the short-acting meds, and feels a sig drop off when he misses doses or spaces out. Asthma has been stable, w/o any recent flare of sx. Pt did get flu shot. Except as noted above in the history of present illness, the review of systems is  negative for headache, vision changes, chest pain, shortness of breath, abdominal pain, urinary sx, bowel changes. Past medical, surgical, and social history reviewed and updated  Medications and allergies reviewed and updated         O: /83   Pulse 77   Temp 97.3 °F (36.3 °C) (Temporal)   Resp 19   Ht 5' 11\" (1.803 m)   Wt 211 lb 8 oz (95.9 kg)   SpO2 97%   BMI 29.50 kg/m²   GEN: No acute distress, cooperative, well nourished, alert. HEENT: PEERLA, EOMI , normocephalic/atraumatic, nares and oropharynx clear.   Mucous membranes normal, Tympanic membranes clear bilaterally. Neck: soft, supple, no thyromegaly, mass, no Lymphadenopathy  CV: Regular rate and rhythm, no murmur, rubs, gallops. No edema. Resp: Clear to auscultation bilaterally good air entry bilaterally  No crackles, wheeze. Breathing comfortably. Psych: mood stable, No suicidal thoughts or ideation despite stressors. Current Outpatient Medications   Medication Sig Dispense Refill    methylphenidate (CONCERTA) 54 MG extended release tablet Take 1 tablet by mouth every morning for 30 days. 30 tablet 0    [START ON 11/19/2020] methylphenidate (CONCERTA) 54 MG extended release tablet Take 1 tablet by mouth every morning for 30 days. 30 tablet 0    [START ON 12/19/2020] methylphenidate (CONCERTA) 54 MG extended release tablet Take 1 tablet by mouth every morning for 30 days. 30 tablet 0    [START ON 12/19/2020] methylphenidate (RITALIN) 5 MG tablet Take 1 tablet by mouth 3 times daily for 30 days. 90 tablet 0    [START ON 11/19/2020] methylphenidate (RITALIN) 5 MG tablet Take 1 tablet by mouth 3 times daily for 30 days. 90 tablet 0    methylphenidate (RITALIN) 5 MG tablet Take 1 tablet by mouth 3 times daily for 30 days. 90 tablet 0    pantoprazole (PROTONIX) 40 MG tablet TAKE 1 TABLET BY MOUTH DAILY 30 tablet 5    diclofenac (VOLTAREN) 75 MG EC tablet TAKE 1 TABLET BY MOUTH TWICE DAILY 60 tablet 5    diclofenac (VOLTAREN) 75 MG EC tablet TAKE 1 TABLET BY MOUTH TWICE DAILY 60 tablet 5    methylphenidate (RITALIN) 10 MG tablet Take 1 tablet by mouth 3 times daily for 30 days. 90 tablet 0    methylphenidate (RITALIN) 10 MG tablet Take 1 tablet by mouth 3 times daily for 30 days. 90 tablet 0    methylphenidate (RITALIN) 10 MG tablet Take 1 tablet by mouth 3 times daily for 30 days. 90 tablet 0     No current facility-administered medications for this visit. ASSESSMENT / PLAN:    1.  Attention deficit disorder, unspecified hyperactivity presence  Doing well but with some side effects with short-acting dosing  Restart concerta 84QA and ritalin 15mg qpm  refills given for 3 months  Pt aware of need for every 3 month medication followup appointments, and that medication refills for benzodiazepines, narcotics and/or stimulants will only be given at appointment. - methylphenidate (CONCERTA) 54 MG extended release tablet; Take 1 tablet by mouth every morning for 30 days. Dispense: 30 tablet; Refill: 0  - methylphenidate (CONCERTA) 54 MG extended release tablet; Take 1 tablet by mouth every morning for 30 days. Dispense: 30 tablet; Refill: 0  - methylphenidate (CONCERTA) 54 MG extended release tablet; Take 1 tablet by mouth every morning for 30 days. Dispense: 30 tablet; Refill: 0  - methylphenidate (RITALIN) 5 MG tablet; Take 1 tablet by mouth 3 times daily for 30 days. Dispense: 90 tablet; Refill: 0  - methylphenidate (RITALIN) 5 MG tablet; Take 1 tablet by mouth 3 times daily for 30 days. Dispense: 90 tablet; Refill: 0  - methylphenidate (RITALIN) 5 MG tablet; Take 1 tablet by mouth 3 times daily for 30 days. Dispense: 90 tablet; Refill: 0    2. Mild intermittent asthma without complication  Stable w/o flare  Cont supportive therapy    3. Stress  Doing well despite stressors  Cont supportive therapy     4. Depression with anxiety  Stable despite stressors  Cont supportive therapy    5. Need for diphtheria-tetanus-pertussis (Tdap) vaccine  Given Tdap today    6. Need for vaccination for pneumococcus  Given pneumovax    7. Fatigue  Check cmp, testosterone level    8. Screen lipids  Check cmp, lipids       Follow-up appointment:   3 mos/prn    Discussed use, benefit, and side effects of all prescribed medications. Barriers to medication compliance addressed. All patient questions answered. Pt voiced understanding. When applicable, patient's outside records were reviewed through [facility]. The patient has signed appropriate paperworks/consents.

## 2021-01-11 RX ORDER — PANTOPRAZOLE SODIUM 40 MG/1
40 TABLET, DELAYED RELEASE ORAL DAILY
Qty: 30 TABLET | Refills: 5 | Status: SHIPPED | OUTPATIENT
Start: 2021-01-11 | End: 2021-07-08 | Stop reason: SDUPTHER

## 2021-01-11 RX ORDER — DICLOFENAC SODIUM 75 MG/1
TABLET, DELAYED RELEASE ORAL
Qty: 60 TABLET | Refills: 5 | Status: SHIPPED | OUTPATIENT
Start: 2021-01-11 | End: 2021-01-26

## 2021-01-11 NOTE — TELEPHONE ENCOUNTER
Requested Prescriptions     Pending Prescriptions Disp Refills    diclofenac (VOLTAREN) 75 MG EC tablet [Pharmacy Med Name: DICLOFENAC SODIUM 75MG DR TABLETS] 60 tablet 5     Sig: TAKE 1 TABLET BY MOUTH TWICE DAILY    pantoprazole (PROTONIX) 40 MG tablet [Pharmacy Med Name: PANTOPRAZOLE 40MG TABLETS] 30 tablet 5     Sig: TAKE 1 TABLET BY MOUTH DAILY     Last ov 10/20/20  Last labs 11/05/19

## 2021-01-20 ENCOUNTER — PATIENT MESSAGE (OUTPATIENT)
Dept: FAMILY MEDICINE CLINIC | Age: 51
End: 2021-01-20

## 2021-01-20 DIAGNOSIS — F98.8 ATTENTION DEFICIT DISORDER, UNSPECIFIED HYPERACTIVITY PRESENCE: ICD-10-CM

## 2021-01-20 NOTE — TELEPHONE ENCOUNTER
Patient is asking for a short script of his concerta until his appt on 1/26. He will run out on 1/20.      Please advise

## 2021-01-20 NOTE — TELEPHONE ENCOUNTER
From: Merleen Galeazzi  To: Sussy Allen MD  Sent: 1/20/2021  8:06 AM EST  Subject: Prescription Question    Hello,    My Concerta prescription runs out on the 20th, but my next appoitment to get refills isn't until the 26th. Would it be possible for me to get a small prescription so that I won't have to go without it for those six days?     Thank you,    Becca Pérez

## 2021-01-22 RX ORDER — METHYLPHENIDATE HYDROCHLORIDE 54 MG/1
54 TABLET ORAL EVERY MORNING
Qty: 30 TABLET | Refills: 0 | Status: SHIPPED | OUTPATIENT
Start: 2021-01-22 | End: 2021-04-12 | Stop reason: SDUPTHER

## 2021-01-26 ENCOUNTER — OFFICE VISIT (OUTPATIENT)
Dept: FAMILY MEDICINE CLINIC | Age: 51
End: 2021-01-26
Payer: COMMERCIAL

## 2021-01-26 VITALS
TEMPERATURE: 97.1 F | HEART RATE: 72 BPM | WEIGHT: 214.4 LBS | OXYGEN SATURATION: 98 % | BODY MASS INDEX: 29.9 KG/M2 | SYSTOLIC BLOOD PRESSURE: 132 MMHG | DIASTOLIC BLOOD PRESSURE: 84 MMHG | RESPIRATION RATE: 14 BRPM

## 2021-01-26 DIAGNOSIS — J06.9 ACUTE URI: ICD-10-CM

## 2021-01-26 DIAGNOSIS — Z79.899 HIGH RISK MEDICATION USE: ICD-10-CM

## 2021-01-26 DIAGNOSIS — R03.0 ELEVATED BLOOD PRESSURE READING: ICD-10-CM

## 2021-01-26 DIAGNOSIS — Z12.5 SCREENING FOR PROSTATE CANCER: ICD-10-CM

## 2021-01-26 DIAGNOSIS — Z13.220 SCREENING, LIPID: ICD-10-CM

## 2021-01-26 DIAGNOSIS — F98.8 ATTENTION DEFICIT DISORDER, UNSPECIFIED HYPERACTIVITY PRESENCE: ICD-10-CM

## 2021-01-26 DIAGNOSIS — J45.20 MILD INTERMITTENT ASTHMA WITHOUT COMPLICATION: Primary | ICD-10-CM

## 2021-01-26 DIAGNOSIS — F41.8 DEPRESSION WITH ANXIETY: ICD-10-CM

## 2021-01-26 DIAGNOSIS — R53.83 FATIGUE, UNSPECIFIED TYPE: ICD-10-CM

## 2021-01-26 PROCEDURE — 99214 OFFICE O/P EST MOD 30 MIN: CPT | Performed by: FAMILY MEDICINE

## 2021-01-26 RX ORDER — METHYLPHENIDATE HYDROCHLORIDE 5 MG/1
5 TABLET ORAL 3 TIMES DAILY
Qty: 90 TABLET | Refills: 0 | Status: SHIPPED | OUTPATIENT
Start: 2021-01-26 | End: 2021-04-12 | Stop reason: SDUPTHER

## 2021-01-26 RX ORDER — METHYLPHENIDATE HYDROCHLORIDE 54 MG/1
54 TABLET ORAL EVERY MORNING
Qty: 30 TABLET | Refills: 0 | Status: SHIPPED | OUTPATIENT
Start: 2021-02-21 | End: 2021-04-12 | Stop reason: SDUPTHER

## 2021-01-26 RX ORDER — METHYLPHENIDATE HYDROCHLORIDE 5 MG/1
5 TABLET ORAL 3 TIMES DAILY
Qty: 90 TABLET | Refills: 0 | Status: SHIPPED | OUTPATIENT
Start: 2021-02-25 | End: 2021-04-12 | Stop reason: SDUPTHER

## 2021-01-26 RX ORDER — METHYLPHENIDATE HYDROCHLORIDE 5 MG/1
5 TABLET ORAL 3 TIMES DAILY
Qty: 90 TABLET | Refills: 0 | Status: SHIPPED | OUTPATIENT
Start: 2021-03-27 | End: 2021-04-12 | Stop reason: SDUPTHER

## 2021-01-26 RX ORDER — METHYLPHENIDATE HYDROCHLORIDE 54 MG/1
54 TABLET ORAL EVERY MORNING
Qty: 30 TABLET | Refills: 0 | Status: SHIPPED | OUTPATIENT
Start: 2021-03-23 | End: 2021-04-12 | Stop reason: SDUPTHER

## 2021-01-26 NOTE — PROGRESS NOTES
Here for f/u and recheck of mood, ADD. Pt states that he has been doing ok, has had some mild URI sx but no sx to suggest COVID infection. Pt has had mild nasal congestion, mild HA but no chest congsetion, no cough and no shortness of breath. No fever, chills. No loss of sense of taste or smell. pts breathing has been doing ok, does have hx of asthma but feels not flared up. Pt has been going to work regularly, has had minimal COVID exposure at this time. Pt managing the stress ok, does feel that despite COVID, has been managing well. Daughter is in school at [employer], and they are doing in - person schooling. Here for follow up of ADD. Pt states that they are doing very well with current therapy and feels that control of symptoms are adequate at this time. No breakthru symptoms and no need/indication for adjustment in therapy. Taking meds as prescribed and denies any illicit medication usage of misuse of their stimulant thearpy. Mood is stable and denies any issues of depression or anxiety associated with treatment of ADD. Except as noted above in the history of present illness, the review of systems is  negative for headache, vision changes, chest pain, shortness of breath, abdominal pain, urinary sx, bowel changes. Past medical, surgical, and social history reviewed and updated  Medications and allergies reviewed and updated         O: /84   Pulse 72   Temp 97.1 °F (36.2 °C) (Temporal)   Resp 14   Wt 214 lb 6.4 oz (97.3 kg)   SpO2 98%   BMI 29.90 kg/m²   GEN: No acute distress, cooperative, well nourished, alert. HEENT: PEERLA, EOMI , normocephalic/atraumatic, nares and oropharynx clear. Mucous membranes normal, Tympanic membranes clear bilaterally. Neck: soft, supple, no thyromegaly, mass, no Lymphadenopathy  CV: Regular rate and rhythm, no murmur, rubs, gallops. No edema. Resp: Clear to auscultation bilaterally good air entry bilaterally  No crackles, wheeze. intermittent asthma without complication  Stable w/o flare  Cont supportive therapy    3. Depression with anxiety  Mood stable, controlled    4. Acute URI  Exam normal, no sx to suggest COVID infection  Monitor for increased sx    5. Fatigue, unspecified type  Check bloodwork as below  - CBC; Future  - Testosterone; Future  - Hemoglobin A1C; Future  - Comprehensive Metabolic Panel; Future  - TSH without Reflex; Future    6. Screening, lipid  Check fasting bloodwork for cmp, lipids  - Lipid Panel; Future    7. High risk medication use  Side effects of stimulant medications discussed, including anxiety, irritability , decreased appetite , weight loss, hypertension, palpitations. Patient aware of addiction potential and need to  medication prescriptions at office at time of appointment. 8. Elevated blood pressure reading  blood pressure stable @ goal, controlled    9. Screening for prostate cancer  - PSA, Prostatic Specific Antigen; Future           Follow-up appointment:   3 mos/prn    Discussed use, benefit, and side effects of all prescribed medications. Barriers to medication compliance addressed. All patient questions answered. Pt voiced understanding. When applicable, patient's outside records were reviewed through [facility]. The patient has signed appropriate paperworks/consents.

## 2021-04-12 ENCOUNTER — OFFICE VISIT (OUTPATIENT)
Dept: FAMILY MEDICINE CLINIC | Age: 51
End: 2021-04-12
Payer: COMMERCIAL

## 2021-04-12 VITALS
DIASTOLIC BLOOD PRESSURE: 79 MMHG | HEART RATE: 68 BPM | SYSTOLIC BLOOD PRESSURE: 133 MMHG | WEIGHT: 210 LBS | TEMPERATURE: 96.7 F | OXYGEN SATURATION: 98 % | RESPIRATION RATE: 16 BRPM | BODY MASS INDEX: 29.29 KG/M2

## 2021-04-12 DIAGNOSIS — F41.8 DEPRESSION WITH ANXIETY: ICD-10-CM

## 2021-04-12 DIAGNOSIS — M54.50 CHRONIC BILATERAL LOW BACK PAIN WITHOUT SCIATICA: Primary | ICD-10-CM

## 2021-04-12 DIAGNOSIS — Z12.5 SCREENING FOR PROSTATE CANCER: ICD-10-CM

## 2021-04-12 DIAGNOSIS — R53.83 FATIGUE, UNSPECIFIED TYPE: ICD-10-CM

## 2021-04-12 DIAGNOSIS — Z13.220 SCREENING, LIPID: ICD-10-CM

## 2021-04-12 DIAGNOSIS — F98.8 ATTENTION DEFICIT DISORDER, UNSPECIFIED HYPERACTIVITY PRESENCE: ICD-10-CM

## 2021-04-12 DIAGNOSIS — G89.29 CHRONIC BILATERAL LOW BACK PAIN WITHOUT SCIATICA: Primary | ICD-10-CM

## 2021-04-12 DIAGNOSIS — Z12.11 SCREEN FOR COLON CANCER: ICD-10-CM

## 2021-04-12 LAB
A/G RATIO: 1.9 (ref 1.1–2.2)
ALBUMIN SERPL-MCNC: 4.7 G/DL (ref 3.4–5)
ALP BLD-CCNC: 70 U/L (ref 40–129)
ALT SERPL-CCNC: 39 U/L (ref 10–40)
ANION GAP SERPL CALCULATED.3IONS-SCNC: 7 MMOL/L (ref 3–16)
AST SERPL-CCNC: 23 U/L (ref 15–37)
BILIRUB SERPL-MCNC: 0.5 MG/DL (ref 0–1)
BUN BLDV-MCNC: 14 MG/DL (ref 7–20)
CALCIUM SERPL-MCNC: 9.1 MG/DL (ref 8.3–10.6)
CHLORIDE BLD-SCNC: 104 MMOL/L (ref 99–110)
CHOLESTEROL, TOTAL: 158 MG/DL (ref 0–199)
CO2: 29 MMOL/L (ref 21–32)
CREAT SERPL-MCNC: 0.9 MG/DL (ref 0.9–1.3)
GFR AFRICAN AMERICAN: >60
GFR NON-AFRICAN AMERICAN: >60
GLOBULIN: 2.5 G/DL
GLUCOSE BLD-MCNC: 95 MG/DL (ref 70–99)
HCT VFR BLD CALC: 46.1 % (ref 40.5–52.5)
HDLC SERPL-MCNC: 34 MG/DL (ref 40–60)
HEMOGLOBIN: 15.6 G/DL (ref 13.5–17.5)
LDL CHOLESTEROL CALCULATED: 111 MG/DL
MCH RBC QN AUTO: 28.5 PG (ref 26–34)
MCHC RBC AUTO-ENTMCNC: 33.7 G/DL (ref 31–36)
MCV RBC AUTO: 84.5 FL (ref 80–100)
PDW BLD-RTO: 13.1 % (ref 12.4–15.4)
PLATELET # BLD: 241 K/UL (ref 135–450)
PMV BLD AUTO: 9.5 FL (ref 5–10.5)
POTASSIUM SERPL-SCNC: 4.8 MMOL/L (ref 3.5–5.1)
PROSTATE SPECIFIC ANTIGEN: 0.33 NG/ML (ref 0–4)
RBC # BLD: 5.46 M/UL (ref 4.2–5.9)
SODIUM BLD-SCNC: 140 MMOL/L (ref 136–145)
TOTAL PROTEIN: 7.2 G/DL (ref 6.4–8.2)
TRIGL SERPL-MCNC: 66 MG/DL (ref 0–150)
TSH SERPL DL<=0.05 MIU/L-ACNC: 1.34 UIU/ML (ref 0.27–4.2)
VLDLC SERPL CALC-MCNC: 13 MG/DL
WBC # BLD: 3.5 K/UL (ref 4–11)

## 2021-04-12 PROCEDURE — 99214 OFFICE O/P EST MOD 30 MIN: CPT | Performed by: FAMILY MEDICINE

## 2021-04-12 RX ORDER — METHYLPHENIDATE HYDROCHLORIDE 54 MG/1
54 TABLET ORAL EVERY MORNING
Qty: 30 TABLET | Refills: 0 | Status: SHIPPED | OUTPATIENT
Start: 2021-05-12 | End: 2021-07-08 | Stop reason: SDUPTHER

## 2021-04-12 RX ORDER — METHYLPHENIDATE HYDROCHLORIDE 54 MG/1
54 TABLET ORAL EVERY MORNING
Qty: 30 TABLET | Refills: 0 | Status: SHIPPED | OUTPATIENT
Start: 2021-06-11 | End: 2021-07-08 | Stop reason: SDUPTHER

## 2021-04-12 RX ORDER — METHYLPHENIDATE HYDROCHLORIDE 5 MG/1
5 TABLET ORAL 3 TIMES DAILY
Qty: 90 TABLET | Refills: 0 | Status: SHIPPED | OUTPATIENT
Start: 2021-06-11 | End: 2021-07-08 | Stop reason: SDUPTHER

## 2021-04-12 RX ORDER — METHYLPHENIDATE HYDROCHLORIDE 54 MG/1
54 TABLET ORAL EVERY MORNING
Qty: 30 TABLET | Refills: 0 | Status: SHIPPED | OUTPATIENT
Start: 2021-04-12 | End: 2021-07-08 | Stop reason: SDUPTHER

## 2021-04-12 RX ORDER — METHYLPHENIDATE HYDROCHLORIDE 5 MG/1
5 TABLET ORAL 3 TIMES DAILY
Qty: 90 TABLET | Refills: 0 | Status: SHIPPED | OUTPATIENT
Start: 2021-05-12 | End: 2021-07-08 | Stop reason: SDUPTHER

## 2021-04-12 RX ORDER — METHYLPHENIDATE HYDROCHLORIDE 5 MG/1
5 TABLET ORAL 3 TIMES DAILY
Qty: 90 TABLET | Refills: 0 | Status: SHIPPED | OUTPATIENT
Start: 2021-04-12 | End: 2021-07-08 | Stop reason: SDUPTHER

## 2021-04-12 NOTE — PROGRESS NOTES
Here for f/u and recheck of mood, ADD,     Pt states that he was able to get COVID vaccine set up and did tolerate ok, with some mild achiness after second shot. Sx were mild with some mild stiffiness in back, but no fever, chills, malaise    Here for follow up of ADD. Pt states that they are doing very well with current therapy and feels that control of symptoms are adequate at this time. No breakthru symptoms and no need/indication for adjustment in therapy. Taking meds as prescribed and denies any illicit medication usage of misuse of their stimulant thearpy. Mood is stable and denies any issues of depression or anxiety associated with treatment of ADD. Mood has been doing well, managing stress ok and not having any new issues or concerns. No breakthru sx despite stressors    Pt has been doing well to stay active with exercise, lifestyle. Pt has been not having any issues of exertional chest pain, shortness of breath. No dizziness. Except as noted above in the history of present illness, the review of systems is  negative for headache, vision changes, chest pain, shortness of breath, abdominal pain, urinary sx, bowel changes. Past medical, surgical, and social history reviewed and updated  Medications and allergies reviewed and updated       O: /79   Pulse 68   Temp 96.7 °F (35.9 °C) (Oral)   Resp 16   Wt 210 lb (95.3 kg)   SpO2 98%   BMI 29.29 kg/m²   GEN: No acute distress, cooperative, well nourished, alert. HEENT: PEERLA, EOMI , normocephalic/atraumatic, nares and oropharynx clear. Mucous membranes normal, Tympanic membranes clear bilaterally. Neck: soft, supple, no thyromegaly, mass, no Lymphadenopathy  CV: Regular rate and rhythm, no murmur, rubs, gallops. No edema. Resp: Clear to auscultation bilaterally good air entry bilaterally  No crackles, wheeze. Breathing comfortably.    Psych: mood stable, No suicidal thoughts or ideation        Current Outpatient Medications   Medication Sig Dispense Refill    [START ON 6/11/2021] methylphenidate (RITALIN) 5 MG tablet Take 1 tablet by mouth 3 times daily for 30 days. 90 tablet 0    [START ON 5/12/2021] methylphenidate (RITALIN) 5 MG tablet Take 1 tablet by mouth 3 times daily for 30 days. 90 tablet 0    [START ON 5/12/2021] methylphenidate (CONCERTA) 54 MG extended release tablet Take 1 tablet by mouth every morning for 30 days. 30 tablet 0    methylphenidate (CONCERTA) 54 MG extended release tablet Take 1 tablet by mouth every morning for 30 days. 30 tablet 0    [START ON 6/11/2021] methylphenidate (CONCERTA) 54 MG extended release tablet Take 1 tablet by mouth every morning for 30 days. 30 tablet 0    methylphenidate (RITALIN) 5 MG tablet Take 1 tablet by mouth 3 times daily for 30 days. 90 tablet 0    pantoprazole (PROTONIX) 40 MG tablet TAKE 1 TABLET BY MOUTH DAILY 30 tablet 5    diclofenac (VOLTAREN) 75 MG EC tablet TAKE 1 TABLET BY MOUTH TWICE DAILY 60 tablet 5     No current facility-administered medications for this visit. ASSESSMENT / PLAN:    1. Attention deficit disorder, unspecified hyperactivity presence  Stable w/ current therapy  refills given for 3 months as below  Pt aware of need for every 3 month medication followup appointments, and that medication refills for benzodiazepines, narcotics and/or stimulants will only be given at appointment. - methylphenidate (RITALIN) 5 MG tablet; Take 1 tablet by mouth 3 times daily for 30 days. Dispense: 90 tablet; Refill: 0  - methylphenidate (RITALIN) 5 MG tablet; Take 1 tablet by mouth 3 times daily for 30 days. Dispense: 90 tablet; Refill: 0  - methylphenidate (CONCERTA) 54 MG extended release tablet; Take 1 tablet by mouth every morning for 30 days. Dispense: 30 tablet; Refill: 0  - methylphenidate (CONCERTA) 54 MG extended release tablet; Take 1 tablet by mouth every morning for 30 days. Dispense: 30 tablet;  Refill: 0  - methylphenidate (CONCERTA) 54 MG extended release tablet; Take 1 tablet by mouth every morning for 30 days. Dispense: 30 tablet; Refill: 0  - methylphenidate (RITALIN) 5 MG tablet; Take 1 tablet by mouth 3 times daily for 30 days. Dispense: 90 tablet; Refill: 0    2. Chronic bilateral low back pain without sciatica  Stable w/o flare  Cont range of motion exercises    3. Depression with anxiety  Mood stable, cont supportive therapy    4. Screening for prostate cancer  Check psa    5. Screen for colon cancer  Due based on age  Refer GI for eval/colonoscopy   - Wanda Pride MD, Gastroenterology, [facility]           Follow-up appointment:   3 mos/prn    Discussed use, benefit, and side effects of all prescribed medications. Barriers to medication compliance addressed. All patient questions answered. Pt voiced understanding. When applicable, [facility]'s [facility]. The patient has signed appropriate paperworks/consents.

## 2021-04-13 LAB
ESTIMATED AVERAGE GLUCOSE: 102.5 MG/DL
HBA1C MFR BLD: 5.2 %

## 2021-04-14 LAB — TESTOSTERONE TOTAL: 580 NG/DL (ref 220–1000)

## 2021-06-25 DIAGNOSIS — F98.8 ATTENTION DEFICIT DISORDER, UNSPECIFIED HYPERACTIVITY PRESENCE: ICD-10-CM

## 2021-06-25 NOTE — TELEPHONE ENCOUNTER
Pt called stating that he is going out of town and wanting to know if he could fill his RX of Concerta today which would be a day early. He said Arik Giraldo has done this for him in the past and the pharmacy needs a verbal ok to refill it early. He would like a call back on his cell [phone] or a [employer] message letting him know.   Please advise  Thank you

## 2021-06-27 RX ORDER — METHYLPHENIDATE HYDROCHLORIDE 54 MG/1
54 TABLET ORAL EVERY MORNING
Qty: 30 TABLET | Refills: 0 | OUTPATIENT
Start: 2021-06-27 | End: 2021-07-27

## 2021-07-08 ENCOUNTER — OFFICE VISIT (OUTPATIENT)
Dept: FAMILY MEDICINE CLINIC | Age: 51
End: 2021-07-08
Payer: COMMERCIAL

## 2021-07-08 VITALS
OXYGEN SATURATION: 100 % | TEMPERATURE: 97.3 F | RESPIRATION RATE: 14 BRPM | HEART RATE: 65 BPM | DIASTOLIC BLOOD PRESSURE: 80 MMHG | SYSTOLIC BLOOD PRESSURE: 122 MMHG | BODY MASS INDEX: 29.54 KG/M2 | WEIGHT: 211.8 LBS

## 2021-07-08 DIAGNOSIS — F41.8 DEPRESSION WITH ANXIETY: ICD-10-CM

## 2021-07-08 DIAGNOSIS — F98.8 ATTENTION DEFICIT DISORDER, UNSPECIFIED HYPERACTIVITY PRESENCE: ICD-10-CM

## 2021-07-08 DIAGNOSIS — Z12.11 SCREEN FOR COLON CANCER: ICD-10-CM

## 2021-07-08 DIAGNOSIS — J45.20 MILD INTERMITTENT ASTHMA WITHOUT COMPLICATION: Primary | ICD-10-CM

## 2021-07-08 PROCEDURE — 99214 OFFICE O/P EST MOD 30 MIN: CPT | Performed by: FAMILY MEDICINE

## 2021-07-08 RX ORDER — METHYLPHENIDATE HYDROCHLORIDE 54 MG/1
54 TABLET ORAL EVERY MORNING
Qty: 30 TABLET | Refills: 0 | Status: SHIPPED | OUTPATIENT
Start: 2021-09-06 | End: 2021-09-29 | Stop reason: SDUPTHER

## 2021-07-08 RX ORDER — METHYLPHENIDATE HYDROCHLORIDE 54 MG/1
54 TABLET ORAL EVERY MORNING
Qty: 30 TABLET | Refills: 0 | Status: SHIPPED | OUTPATIENT
Start: 2021-07-08 | End: 2021-09-29 | Stop reason: SDUPTHER

## 2021-07-08 RX ORDER — METHYLPHENIDATE HYDROCHLORIDE 5 MG/1
5 TABLET ORAL 3 TIMES DAILY
Qty: 90 TABLET | Refills: 0 | Status: SHIPPED | OUTPATIENT
Start: 2021-08-07 | End: 2021-09-29 | Stop reason: SDUPTHER

## 2021-07-08 RX ORDER — PANTOPRAZOLE SODIUM 40 MG/1
40 TABLET, DELAYED RELEASE ORAL DAILY
Qty: 30 TABLET | Refills: 5 | Status: SHIPPED | OUTPATIENT
Start: 2021-07-08 | End: 2022-01-05

## 2021-07-08 RX ORDER — METHYLPHENIDATE HYDROCHLORIDE 5 MG/1
5 TABLET ORAL 3 TIMES DAILY
Qty: 90 TABLET | Refills: 0 | Status: SHIPPED | OUTPATIENT
Start: 2021-09-06 | End: 2021-09-29 | Stop reason: SDUPTHER

## 2021-07-08 RX ORDER — DICLOFENAC SODIUM 75 MG/1
TABLET, DELAYED RELEASE ORAL
Qty: 60 TABLET | Refills: 5 | Status: SHIPPED | OUTPATIENT
Start: 2021-07-08 | End: 2022-01-05

## 2021-07-08 RX ORDER — METHYLPHENIDATE HYDROCHLORIDE 5 MG/1
5 TABLET ORAL 3 TIMES DAILY
Qty: 90 TABLET | Refills: 0 | Status: SHIPPED | OUTPATIENT
Start: 2021-07-08 | End: 2021-09-29 | Stop reason: SDUPTHER

## 2021-07-08 RX ORDER — METHYLPHENIDATE HYDROCHLORIDE 54 MG/1
54 TABLET ORAL EVERY MORNING
Qty: 30 TABLET | Refills: 0 | Status: SHIPPED | OUTPATIENT
Start: 2021-08-07 | End: 2021-09-29 | Stop reason: SDUPTHER

## 2021-07-08 SDOH — ECONOMIC STABILITY: FOOD INSECURITY: WITHIN THE PAST 12 MONTHS, THE FOOD YOU BOUGHT JUST DIDN'T LAST AND YOU DIDN'T HAVE MONEY TO GET MORE.: NEVER TRUE

## 2021-07-08 SDOH — ECONOMIC STABILITY: TRANSPORTATION INSECURITY
IN THE PAST 12 MONTHS, HAS THE LACK OF TRANSPORTATION KEPT YOU FROM MEDICAL APPOINTMENTS OR FROM GETTING MEDICATIONS?: NO

## 2021-07-08 SDOH — ECONOMIC STABILITY: TRANSPORTATION INSECURITY
IN THE PAST 12 MONTHS, HAS LACK OF TRANSPORTATION KEPT YOU FROM MEETINGS, WORK, OR FROM GETTING THINGS NEEDED FOR DAILY LIVING?: NO

## 2021-07-08 SDOH — ECONOMIC STABILITY: FOOD INSECURITY: WITHIN THE PAST 12 MONTHS, YOU WORRIED THAT YOUR FOOD WOULD RUN OUT BEFORE YOU GOT MONEY TO BUY MORE.: NEVER TRUE

## 2021-07-08 ASSESSMENT — SOCIAL DETERMINANTS OF HEALTH (SDOH): HOW HARD IS IT FOR YOU TO PAY FOR THE VERY BASICS LIKE FOOD, HOUSING, MEDICAL CARE, AND HEATING?: NOT HARD AT ALL

## 2021-07-08 NOTE — PROGRESS NOTES
Here for f/u and recheck of ADD, asthma, mood    Pt states that things are doing well, summer is going well. Pt states he has been doing great , staying healthy. Pts daughter is transferring to [employer] and she is looking at getting on campus for school. Pt has been doing well with mood, and feels that control of anxiety/mood doing well. Sleeping doing well overall. Here for follow up of ADD. Pt states that they are doing very well with current therapy and feels that control of symptoms are adequate at this time. No breakthru symptoms and no need/indication for adjustment in therapy. Taking meds as prescribed and denies any illicit medication usage of misuse of their stimulant thearpy. Mood is stable and denies any issues of depression or anxiety associated with treatment of ADD. Pt has been staying consistent with lifestyle, diet/exercise. Pt does not have any issues of exertional chest pain, and does monitor portions well. Pt was on vacation and did a lot of walking while he was there. Pt did have some recent bloodwork that was stable      Except as noted above in the history of present illness, the review of systems is  negative for headache, vision changes, chest pain, shortness of breath, abdominal pain, urinary sx, bowel changes. Past medical, surgical, and social history reviewed and updated  Medications and allergies reviewed and updated        O: /80   Pulse 65   Temp 97.3 °F (36.3 °C) (Temporal)   Resp 14   Wt 211 lb 12.8 oz (96.1 kg)   SpO2 100%   BMI 29.54 kg/m²   GEN: No acute distress, cooperative, well nourished, alert. HEENT: PEERLA, EOMI , normocephalic/atraumatic, nares and oropharynx clear. Mucous membranes normal, Tympanic membranes clear bilaterally. Neck: soft, supple, no thyromegaly, mass, no Lymphadenopathy  CV: Regular rate and rhythm, no murmur, rubs, gallops. No edema.   Resp: Clear to auscultation bilaterally good air entry bilaterally  No crackles, wheeze. Breathing comfortably. Psych: mood stable, No suicidal thoughts or ideation   Ext: no clubbing, cyanosis, edema        Current Outpatient Medications   Medication Sig Dispense Refill    [START ON 9/6/2021] methylphenidate (RITALIN) 5 MG tablet Take 1 tablet by mouth 3 times daily for 30 days. 90 tablet 0    [START ON 8/7/2021] methylphenidate (RITALIN) 5 MG tablet Take 1 tablet by mouth 3 times daily for 30 days. 90 tablet 0    methylphenidate (CONCERTA) 54 MG extended release tablet Take 1 tablet by mouth every morning for 30 days. 30 tablet 0    [START ON 9/6/2021] methylphenidate (CONCERTA) 54 MG extended release tablet Take 1 tablet by mouth every morning for 30 days. 30 tablet 0    [START ON 8/7/2021] methylphenidate (CONCERTA) 54 MG extended release tablet Take 1 tablet by mouth every morning for 30 days. 30 tablet 0    methylphenidate (RITALIN) 5 MG tablet Take 1 tablet by mouth 3 times daily for 30 days. 90 tablet 0    pantoprazole (PROTONIX) 40 MG tablet Take 1 tablet by mouth daily 30 tablet 5    diclofenac (VOLTAREN) 75 MG EC tablet TAKE 1 TABLET BY MOUTH TWICE DAILY 60 tablet 5     No current facility-administered medications for this visit. ASSESSMENT / PLAN:    1. Attention deficit disorder, unspecified hyperactivity presence  Stable w/ current therapy  refills given for 3 months  Pt aware of need for every 3 month medication followup appointments, and that medication refills for benzodiazepines, narcotics and/or stimulants will only be given at appointment. See [facility]  Med agreement updated today  - methylphenidate (RITALIN) 5 MG tablet; Take 1 tablet by mouth 3 times daily for 30 days. Dispense: 90 tablet; Refill: 0  - methylphenidate (RITALIN) 5 MG tablet; Take 1 tablet by mouth 3 times daily for 30 days. Dispense: 90 tablet; Refill: 0  - methylphenidate (CONCERTA) 54 MG extended release tablet; Take 1 tablet by mouth every morning for 30 days. Dispense: 30 tablet;  Refill: 0  - methylphenidate (CONCERTA) 54 MG extended release tablet; Take 1 tablet by mouth every morning for 30 days. Dispense: 30 tablet; Refill: 0  - methylphenidate (CONCERTA) 54 MG extended release tablet; Take 1 tablet by mouth every morning for 30 days. Dispense: 30 tablet; Refill: 0  - methylphenidate (RITALIN) 5 MG tablet; Take 1 tablet by mouth 3 times daily for 30 days. Dispense: 90 tablet; Refill: 0    2. Mild intermittent asthma without complication  Stable w/o flare  Cont supportive therapy    3. Depression with anxiety  Stable w/o flare, controlled    4. Screen for colon cancer  Has appt next week for screening colonoscopy            Follow-up appointment:   3 mos/prn    Discussed use, benefit, and side effects of all prescribed medications. Barriers to medication compliance addressed. All patient questions answered. Pt voiced understanding. When applicable, patient's outside records were reviewed through [facility]. The patient has signed appropriate paperworks/consents.

## 2021-08-04 ENCOUNTER — TELEPHONE (OUTPATIENT)
Dept: ORTHOPEDIC SURGERY | Age: 51
End: 2021-08-04

## 2021-09-29 ENCOUNTER — OFFICE VISIT (OUTPATIENT)
Dept: FAMILY MEDICINE CLINIC | Age: 51
End: 2021-09-29
Payer: COMMERCIAL

## 2021-09-29 VITALS
HEART RATE: 73 BPM | SYSTOLIC BLOOD PRESSURE: 126 MMHG | RESPIRATION RATE: 12 BRPM | BODY MASS INDEX: 28.73 KG/M2 | OXYGEN SATURATION: 100 % | WEIGHT: 206 LBS | DIASTOLIC BLOOD PRESSURE: 86 MMHG | TEMPERATURE: 97.8 F

## 2021-09-29 DIAGNOSIS — J30.89 NON-SEASONAL ALLERGIC RHINITIS, UNSPECIFIED TRIGGER: ICD-10-CM

## 2021-09-29 DIAGNOSIS — Z23 NEEDS FLU SHOT: ICD-10-CM

## 2021-09-29 DIAGNOSIS — Z23 NEED FOR SHINGLES VACCINE: ICD-10-CM

## 2021-09-29 DIAGNOSIS — F41.8 DEPRESSION WITH ANXIETY: ICD-10-CM

## 2021-09-29 DIAGNOSIS — J45.20 MILD INTERMITTENT ASTHMA WITHOUT COMPLICATION: Primary | ICD-10-CM

## 2021-09-29 DIAGNOSIS — F98.8 ATTENTION DEFICIT DISORDER, UNSPECIFIED HYPERACTIVITY PRESENCE: ICD-10-CM

## 2021-09-29 PROCEDURE — 90750 HZV VACC RECOMBINANT IM: CPT | Performed by: FAMILY MEDICINE

## 2021-09-29 PROCEDURE — 90674 CCIIV4 VAC NO PRSV 0.5 ML IM: CPT | Performed by: FAMILY MEDICINE

## 2021-09-29 PROCEDURE — 90471 IMMUNIZATION ADMIN: CPT | Performed by: FAMILY MEDICINE

## 2021-09-29 PROCEDURE — 99214 OFFICE O/P EST MOD 30 MIN: CPT | Performed by: FAMILY MEDICINE

## 2021-09-29 PROCEDURE — 90472 IMMUNIZATION ADMIN EACH ADD: CPT | Performed by: FAMILY MEDICINE

## 2021-09-29 RX ORDER — METHYLPHENIDATE HYDROCHLORIDE 54 MG/1
54 TABLET ORAL EVERY MORNING
Qty: 30 TABLET | Refills: 0 | Status: SHIPPED | OUTPATIENT
Start: 2021-09-29 | End: 2021-12-22 | Stop reason: SDUPTHER

## 2021-09-29 RX ORDER — METHYLPHENIDATE HYDROCHLORIDE 5 MG/1
5 TABLET ORAL 3 TIMES DAILY
Qty: 90 TABLET | Refills: 0 | Status: SHIPPED | OUTPATIENT
Start: 2021-11-28 | End: 2021-12-22 | Stop reason: SDUPTHER

## 2021-09-29 RX ORDER — METHYLPHENIDATE HYDROCHLORIDE 5 MG/1
5 TABLET ORAL 3 TIMES DAILY
Qty: 90 TABLET | Refills: 0 | Status: SHIPPED | OUTPATIENT
Start: 2021-10-29 | End: 2021-12-22 | Stop reason: SDUPTHER

## 2021-09-29 RX ORDER — METHYLPHENIDATE HYDROCHLORIDE 54 MG/1
54 TABLET ORAL EVERY MORNING
Qty: 30 TABLET | Refills: 0 | Status: SHIPPED | OUTPATIENT
Start: 2021-11-28 | End: 2021-12-22 | Stop reason: SDUPTHER

## 2021-09-29 RX ORDER — METHYLPHENIDATE HYDROCHLORIDE 5 MG/1
5 TABLET ORAL 3 TIMES DAILY
Qty: 90 TABLET | Refills: 0 | Status: SHIPPED | OUTPATIENT
Start: 2021-09-29 | End: 2021-12-22 | Stop reason: SDUPTHER

## 2021-09-29 RX ORDER — METHYLPHENIDATE HYDROCHLORIDE 54 MG/1
54 TABLET ORAL EVERY MORNING
Qty: 30 TABLET | Refills: 0 | Status: SHIPPED | OUTPATIENT
Start: 2021-10-29 | End: 2021-12-22 | Stop reason: SDUPTHER

## 2021-09-29 NOTE — PROGRESS NOTES
Here for f/u and recheck of asthma, ADD, mood    Pt states that things are doing well, states that he is managing the stress of COVID. Pt has been managing ok and feeling healthy. Pt did get vaccinated and tolerated well. Pts allergies are up a little bit, but no fever, chills, no shortness of breath, and no lost of taste of smell. Here for follow up of ADD. Pt states that they are doing very well with current therapy and feels that control of symptoms are adequate at this time. No breakthru symptoms and no need/indication for adjustment in therapy. Taking meds as prescribed and denies any illicit medication usage of misuse of their stimulant thearpy. Mood is stable and denies any issues of depression or anxiety associated with treatment of ADD. Pt feels that mood has been doing ok but states that he has noted some increase in sentimentality. Pt states that there has been a lot of changes, with daughter going to college, work stressors and such. They did move as well. No sx of ST/SI      Except as noted above in the history of present illness, the review of systems is  negative for headache, vision changes, chest pain, shortness of breath, abdominal pain, urinary sx, bowel changes. Past medical, surgical, and social history reviewed and updated  Medications and allergies reviewed and updated      O: /86   Pulse 73   Temp 97.8 °F (36.6 °C) (Temporal)   Resp 12   Wt 206 lb (93.4 kg)   SpO2 100%   BMI 28.73 kg/m²   GEN: No acute distress, cooperative, well nourished, alert. HEENT: PEERLA, EOMI , normocephalic/atraumatic, nares and oropharynx clear. Mucous membranes normal, Tympanic membranes clear bilaterally. Neck: soft, supple, no thyromegaly, mass, no Lymphadenopathy  CV: Regular rate and rhythm, no murmur, rubs, gallops. No edema. Resp: Clear to auscultation bilaterally good air entry bilaterally  No crackles, wheeze. Breathing comfortably.    Psych: mood stable, No suicidal thoughts or ideation       Current Outpatient Medications   Medication Sig Dispense Refill    [START ON 11/28/2021] methylphenidate (CONCERTA) 54 MG extended release tablet Take 1 tablet by mouth every morning for 30 days. 30 tablet 0    [START ON 10/29/2021] methylphenidate (CONCERTA) 54 MG extended release tablet Take 1 tablet by mouth every morning for 30 days. 30 tablet 0    methylphenidate (CONCERTA) 54 MG extended release tablet Take 1 tablet by mouth every morning for 30 days. 30 tablet 0    [START ON 11/28/2021] methylphenidate (RITALIN) 5 MG tablet Take 1 tablet by mouth 3 times daily for 30 days. 90 tablet 0    [START ON 10/29/2021] methylphenidate (RITALIN) 5 MG tablet Take 1 tablet by mouth 3 times daily for 30 days. 90 tablet 0    methylphenidate (RITALIN) 5 MG tablet Take 1 tablet by mouth 3 times daily for 30 days. 90 tablet 0    pantoprazole (PROTONIX) 40 MG tablet Take 1 tablet by mouth daily 30 tablet 5    diclofenac (VOLTAREN) 75 MG EC tablet TAKE 1 TABLET BY MOUTH TWICE DAILY 60 tablet 5     No current facility-administered medications for this visit. ASSESSMENT / PLAN:    1. Mild intermittent asthma without complication  Stable w/o flare    2. Attention deficit disorder, unspecified hyperactivity presence  Stable with current therapy, refills given for 3 months  Pt aware of need for every 3 month medication followup appointments, and that medication refills for benzodiazepines, narcotics and/or stimulants will only be given at appointment. - methylphenidate (CONCERTA) 54 MG extended release tablet; Take 1 tablet by mouth every morning for 30 days. Dispense: 30 tablet; Refill: 0  - methylphenidate (CONCERTA) 54 MG extended release tablet; Take 1 tablet by mouth every morning for 30 days. Dispense: 30 tablet; Refill: 0  - methylphenidate (CONCERTA) 54 MG extended release tablet; Take 1 tablet by mouth every morning for 30 days. Dispense: 30 tablet;  Refill: 0  - methylphenidate (RITALIN) 5 MG tablet; Take 1 tablet by mouth 3 times daily for 30 days. Dispense: 90 tablet; Refill: 0  - methylphenidate (RITALIN) 5 MG tablet; Take 1 tablet by mouth 3 times daily for 30 days. Dispense: 90 tablet; Refill: 0  - methylphenidate (RITALIN) 5 MG tablet; Take 1 tablet by mouth 3 times daily for 30 days. Dispense: 90 tablet; Refill: 0    3. Depression with anxiety  Doing ok with some mild to moderate stressors  Did do therapy in the past, and was helpful. Pt does not feel sx are sig enough to warrant f/u with psychology or discussion of meds  Will monitor closely and f/u for increased or breakthru sx    4. Non-seasonal allergic rhinitis, unspecified trigger  Cont supportive therapy    5. Needs flu shot  Given today    6. Need for shingles vaccine  Given today           Follow-up appointment:   3 months/prn    Discussed use, benefit, and side effects of all prescribed medications. Barriers to medication compliance addressed. All patient questions answered. Pt voiced understanding. When applicable, [facility]'s [facility]. The patient has signed appropriate paperworks/consents.

## 2021-12-22 ENCOUNTER — OFFICE VISIT (OUTPATIENT)
Dept: FAMILY MEDICINE CLINIC | Age: 51
End: 2021-12-22
Payer: COMMERCIAL

## 2021-12-22 VITALS
WEIGHT: 196.4 LBS | HEIGHT: 69 IN | OXYGEN SATURATION: 98 % | BODY MASS INDEX: 29.09 KG/M2 | TEMPERATURE: 97.6 F | HEART RATE: 76 BPM | DIASTOLIC BLOOD PRESSURE: 86 MMHG | SYSTOLIC BLOOD PRESSURE: 120 MMHG | RESPIRATION RATE: 12 BRPM

## 2021-12-22 DIAGNOSIS — F41.8 DEPRESSION WITH ANXIETY: Primary | ICD-10-CM

## 2021-12-22 DIAGNOSIS — F98.8 ATTENTION DEFICIT DISORDER, UNSPECIFIED HYPERACTIVITY PRESENCE: ICD-10-CM

## 2021-12-22 DIAGNOSIS — Z23 NEED FOR SHINGLES VACCINE: ICD-10-CM

## 2021-12-22 DIAGNOSIS — J30.89 NON-SEASONAL ALLERGIC RHINITIS, UNSPECIFIED TRIGGER: ICD-10-CM

## 2021-12-22 PROCEDURE — 99214 OFFICE O/P EST MOD 30 MIN: CPT | Performed by: FAMILY MEDICINE

## 2021-12-22 RX ORDER — METHYLPHENIDATE HYDROCHLORIDE 5 MG/1
5 TABLET ORAL 3 TIMES DAILY
Qty: 90 TABLET | Refills: 0 | Status: SHIPPED | OUTPATIENT
Start: 2021-12-22 | End: 2022-03-22 | Stop reason: SDUPTHER

## 2021-12-22 RX ORDER — METHYLPHENIDATE HYDROCHLORIDE 54 MG/1
54 TABLET ORAL EVERY MORNING
Qty: 30 TABLET | Refills: 0 | Status: SHIPPED | OUTPATIENT
Start: 2022-01-21 | End: 2022-03-22 | Stop reason: SDUPTHER

## 2021-12-22 RX ORDER — METHYLPHENIDATE HYDROCHLORIDE 5 MG/1
5 TABLET ORAL 3 TIMES DAILY
Qty: 90 TABLET | Refills: 0 | Status: SHIPPED | OUTPATIENT
Start: 2022-02-20 | End: 2022-03-22 | Stop reason: SDUPTHER

## 2021-12-22 RX ORDER — METHYLPHENIDATE HYDROCHLORIDE 54 MG/1
54 TABLET ORAL EVERY MORNING
Qty: 30 TABLET | Refills: 0 | Status: SHIPPED | OUTPATIENT
Start: 2022-02-20 | End: 2022-03-22 | Stop reason: SDUPTHER

## 2021-12-22 RX ORDER — METHYLPHENIDATE HYDROCHLORIDE 54 MG/1
54 TABLET ORAL EVERY MORNING
Qty: 30 TABLET | Refills: 0 | Status: SHIPPED | OUTPATIENT
Start: 2021-12-22 | End: 2022-03-22 | Stop reason: SDUPTHER

## 2021-12-22 RX ORDER — METHYLPHENIDATE HYDROCHLORIDE 5 MG/1
5 TABLET ORAL 3 TIMES DAILY
Qty: 90 TABLET | Refills: 0 | Status: SHIPPED | OUTPATIENT
Start: 2022-01-21 | End: 2022-03-22 | Stop reason: SDUPTHER

## 2021-12-22 NOTE — PROGRESS NOTES
Here for f/u and recheck ADD, mood    Pt states that things are doing well, staying healthy despite COVID pandemic. Pt is working on getting vaccinated soon. Pt has been healthy and is doing well to stay active, has been playing volleyball regularly and staying active. Pt has felt well, denies any issues of chest pain, shortness of breath. No abd pain, bowel changes and no urinary sx. Pt feels well with activity, exercise. Here for follow up of ADD. Pt states that they are doing very well with current therapy and feels that control of symptoms are adequate at this time. No breakthru symptoms and no need/indication for adjustment in therapy. Taking meds as prescribed and denies any illicit medication usage of misuse of their stimulant thearpy. Mood is stable and denies any issues of depression or anxiety associated with treatment of ADD. Except as noted above in the history of present illness, the review of systems is  negative for headache, vision changes, chest pain, shortness of breath, abdominal pain, urinary sx, bowel changes. Past medical, surgical, and social history reviewed and updated  Medications and allergies reviewed and updated        O: /86   Pulse 76   Temp 97.6 °F (36.4 °C) (Temporal)   Resp 12   Ht 5' 8.98\" (1.752 m)   Wt 196 lb 6.4 oz (89.1 kg)   SpO2 98%   BMI 29.02 kg/m²   GEN: No acute distress, cooperative, well nourished, alert. HEENT: PEERLA, EOMI , normocephalic/atraumatic, nares and oropharynx clear. Mucous membranes normal, Tympanic membranes clear bilaterally. Neck: soft, supple, no thyromegaly, mass, no Lymphadenopathy  CV: Regular rate and rhythm, no murmur, rubs, gallops. No edema. Resp: Clear to auscultation bilaterally good air entry bilaterally  No crackles, wheeze. Breathing comfortably.    Psych: mood stable, No suicidal thoughts or ideation     Current Outpatient Medications   Medication Sig Dispense Refill    methylphenidate (CONCERTA) 54 MG extended release tablet Take 1 tablet by mouth every morning for 30 days. 30 tablet 0    methylphenidate (RITALIN) 5 MG tablet Take 1 tablet by mouth 3 times daily for 30 days. 90 tablet 0    pantoprazole (PROTONIX) 40 MG tablet Take 1 tablet by mouth daily 30 tablet 5    diclofenac (VOLTAREN) 75 MG EC tablet TAKE 1 TABLET BY MOUTH TWICE DAILY 60 tablet 5    methylphenidate (CONCERTA) 54 MG extended release tablet Take 1 tablet by mouth every morning for 30 days. 30 tablet 0    methylphenidate (CONCERTA) 54 MG extended release tablet Take 1 tablet by mouth every morning for 30 days. 30 tablet 0    methylphenidate (RITALIN) 5 MG tablet Take 1 tablet by mouth 3 times daily for 30 days. 90 tablet 0    methylphenidate (RITALIN) 5 MG tablet Take 1 tablet by mouth 3 times daily for 30 days. 90 tablet 0     No current facility-administered medications for this visit. ASSESSMENT / PLAN:    1. Attention deficit disorder, unspecified hyperactivity presence  Stable with current therapy  refills given for 3 months  Pt aware of need for every 3 month medication followup appointments, and that medication refills for benzodiazepines, narcotics and/or stimulants will only be given at appointment. - methylphenidate (CONCERTA) 54 MG extended release tablet; Take 1 tablet by mouth every morning for 30 days. Dispense: 30 tablet; Refill: 0  - methylphenidate (CONCERTA) 54 MG extended release tablet; Take 1 tablet by mouth every morning for 30 days. Dispense: 30 tablet; Refill: 0  - methylphenidate (CONCERTA) 54 MG extended release tablet; Take 1 tablet by mouth every morning for 30 days. Dispense: 30 tablet; Refill: 0  - methylphenidate (RITALIN) 5 MG tablet; Take 1 tablet by mouth 3 times daily for 30 days. Dispense: 90 tablet; Refill: 0  - methylphenidate (RITALIN) 5 MG tablet; Take 1 tablet by mouth 3 times daily for 30 days. Dispense: 90 tablet;  Refill: 0  - methylphenidate (RITALIN) 5 MG tablet; Take 1 tablet by mouth 3 times daily for 30 days. Dispense: 90 tablet; Refill: 0    2. Depression with anxiety  Mood stable, doing well despite stressors    3. Need for shingles vaccine  Will hold at this time pending getting booster for COVID  Will have done at f/u 3 months    4. Non-seasonal allergic rhinitis, unspecified trigger  Cont supportive therapy           Follow-up appointment:   3 mos/prn    Discussed use, benefit, and side effects of all prescribed medications. Barriers to medication compliance addressed. All patient questions answered. Pt voiced understanding. When applicable, patient's outside records were reviewed through [facility]. The patient has signed appropriate paperworks/consents.

## 2022-01-05 RX ORDER — PANTOPRAZOLE SODIUM 40 MG/1
40 TABLET, DELAYED RELEASE ORAL DAILY
Qty: 30 TABLET | Refills: 5 | Status: SHIPPED | OUTPATIENT
Start: 2022-01-05 | End: 2022-07-12

## 2022-01-05 RX ORDER — DICLOFENAC SODIUM 75 MG/1
TABLET, DELAYED RELEASE ORAL
Qty: 60 TABLET | Refills: 5 | Status: SHIPPED | OUTPATIENT
Start: 2022-01-05 | End: 2022-07-12

## 2022-03-22 ENCOUNTER — OFFICE VISIT (OUTPATIENT)
Dept: FAMILY MEDICINE CLINIC | Age: 52
End: 2022-03-22
Payer: COMMERCIAL

## 2022-03-22 ENCOUNTER — HOSPITAL ENCOUNTER (OUTPATIENT)
Age: 52
Discharge: HOME | End: 2022-03-22
Payer: COMMERCIAL

## 2022-03-22 ENCOUNTER — HOSPITAL ENCOUNTER (OUTPATIENT)
Dept: GENERAL RADIOLOGY | Age: 52
Discharge: HOME | End: 2022-03-22
Payer: COMMERCIAL

## 2022-03-22 VITALS
HEART RATE: 62 BPM | BODY MASS INDEX: 29.26 KG/M2 | WEIGHT: 198 LBS | TEMPERATURE: 95.7 F | SYSTOLIC BLOOD PRESSURE: 134 MMHG | DIASTOLIC BLOOD PRESSURE: 72 MMHG | OXYGEN SATURATION: 98 %

## 2022-03-22 DIAGNOSIS — M79.671 CHRONIC HEEL PAIN, RIGHT: Primary | ICD-10-CM

## 2022-03-22 DIAGNOSIS — Z23 NEED FOR SHINGLES VACCINE: ICD-10-CM

## 2022-03-22 DIAGNOSIS — F41.8 DEPRESSION WITH ANXIETY: ICD-10-CM

## 2022-03-22 DIAGNOSIS — J45.20 MILD INTERMITTENT ASTHMA WITHOUT COMPLICATION: ICD-10-CM

## 2022-03-22 DIAGNOSIS — G89.29 CHRONIC BILATERAL LOW BACK PAIN WITHOUT SCIATICA: ICD-10-CM

## 2022-03-22 DIAGNOSIS — M79.671 CHRONIC HEEL PAIN, RIGHT: ICD-10-CM

## 2022-03-22 DIAGNOSIS — G89.29 CHRONIC HEEL PAIN, RIGHT: Primary | ICD-10-CM

## 2022-03-22 DIAGNOSIS — F98.8 ATTENTION DEFICIT DISORDER, UNSPECIFIED HYPERACTIVITY PRESENCE: ICD-10-CM

## 2022-03-22 DIAGNOSIS — M54.50 CHRONIC BILATERAL LOW BACK PAIN WITHOUT SCIATICA: ICD-10-CM

## 2022-03-22 DIAGNOSIS — G89.29 CHRONIC HEEL PAIN, RIGHT: ICD-10-CM

## 2022-03-22 PROCEDURE — 73630 X-RAY EXAM OF FOOT: CPT

## 2022-03-22 PROCEDURE — 99214 OFFICE O/P EST MOD 30 MIN: CPT | Performed by: FAMILY MEDICINE

## 2022-03-22 PROCEDURE — 73650 X-RAY EXAM OF HEEL: CPT

## 2022-03-22 PROCEDURE — 90471 IMMUNIZATION ADMIN: CPT | Performed by: FAMILY MEDICINE

## 2022-03-22 PROCEDURE — 90750 HZV VACC RECOMBINANT IM: CPT | Performed by: FAMILY MEDICINE

## 2022-03-22 RX ORDER — METHYLPHENIDATE HYDROCHLORIDE 5 MG/1
5 TABLET ORAL 3 TIMES DAILY
Qty: 90 TABLET | Refills: 0 | Status: SHIPPED | OUTPATIENT
Start: 2022-03-22 | End: 2022-07-20 | Stop reason: SDUPTHER

## 2022-03-22 RX ORDER — METHYLPHENIDATE HYDROCHLORIDE 5 MG/1
5 TABLET ORAL 3 TIMES DAILY
Qty: 90 TABLET | Refills: 0 | Status: SHIPPED | OUTPATIENT
Start: 2022-04-21 | End: 2022-07-20 | Stop reason: SDUPTHER

## 2022-03-22 RX ORDER — METHYLPHENIDATE HYDROCHLORIDE 54 MG/1
54 TABLET ORAL EVERY MORNING
Qty: 30 TABLET | Refills: 0 | Status: SHIPPED | OUTPATIENT
Start: 2022-04-21 | End: 2022-07-20 | Stop reason: SDUPTHER

## 2022-03-22 RX ORDER — METHYLPHENIDATE HYDROCHLORIDE 54 MG/1
54 TABLET ORAL EVERY MORNING
Qty: 30 TABLET | Refills: 0 | Status: SHIPPED | OUTPATIENT
Start: 2022-05-21 | End: 2022-07-20 | Stop reason: SDUPTHER

## 2022-03-22 RX ORDER — METHYLPHENIDATE HYDROCHLORIDE 5 MG/1
5 TABLET ORAL 3 TIMES DAILY
Qty: 90 TABLET | Refills: 0 | Status: SHIPPED | OUTPATIENT
Start: 2022-05-21 | End: 2022-07-20 | Stop reason: SDUPTHER

## 2022-03-22 RX ORDER — METHYLPHENIDATE HYDROCHLORIDE 54 MG/1
54 TABLET ORAL EVERY MORNING
Qty: 30 TABLET | Refills: 0 | Status: SHIPPED | OUTPATIENT
Start: 2022-03-22 | End: 2022-07-20 | Stop reason: SDUPTHER

## 2022-03-22 ASSESSMENT — PATIENT HEALTH QUESTIONNAIRE - PHQ9
4. FEELING TIRED OR HAVING LITTLE ENERGY: 0
SUM OF ALL RESPONSES TO PHQ QUESTIONS 1-9: 0
3. TROUBLE FALLING OR STAYING ASLEEP: 0
SUM OF ALL RESPONSES TO PHQ QUESTIONS 1-9: 0
SUM OF ALL RESPONSES TO PHQ QUESTIONS 1-9: 0
8. MOVING OR SPEAKING SO SLOWLY THAT OTHER PEOPLE COULD HAVE NOTICED. OR THE OPPOSITE, BEING SO FIGETY OR RESTLESS THAT YOU HAVE BEEN MOVING AROUND A LOT MORE THAN USUAL: 0
5. POOR APPETITE OR OVEREATING: 0
6. FEELING BAD ABOUT YOURSELF - OR THAT YOU ARE A FAILURE OR HAVE LET YOURSELF OR YOUR FAMILY DOWN: 0
9. THOUGHTS THAT YOU WOULD BE BETTER OFF DEAD, OR OF HURTING YOURSELF: 0
10. IF YOU CHECKED OFF ANY PROBLEMS, HOW DIFFICULT HAVE THESE PROBLEMS MADE IT FOR YOU TO DO YOUR WORK, TAKE CARE OF THINGS AT HOME, OR GET ALONG WITH OTHER PEOPLE: 0
7. TROUBLE CONCENTRATING ON THINGS, SUCH AS READING THE NEWSPAPER OR WATCHING TELEVISION: 0
SUM OF ALL RESPONSES TO PHQ QUESTIONS 1-9: 0
2. FEELING DOWN, DEPRESSED OR HOPELESS: 0
SUM OF ALL RESPONSES TO PHQ9 QUESTIONS 1 & 2: 0
1. LITTLE INTEREST OR PLEASURE IN DOING THINGS: 0

## 2022-03-22 NOTE — PROGRESS NOTES
Here for some issues of persistent plantar fasciitis. Pt has had for a bit and has been doing some stretching, but states that recently felt he got a flare of sx after some increased activity. Pt only with sx on R side. No acute injury or trauma. Pt does play sand volleyball and that does well w/o pain. Pt did get arch supports which are helpful but has to wear shoes . Sx are not in achilles area but leann in back of heel. No sx on L. No issues of bruising or swelling. Pt does take diclofenac regularly so does not take anything else. Usually takes only @ night, occasionally in the AM    Here for follow up of ADD. Pt states that they are doing very well with current therapy and feels that control of symptoms are adequate at this time. No breakthru symptoms and no need/indication for adjustment in therapy. Taking meds as prescribed and denies any illicit medication usage of misuse of their stimulant thearpy. Mood is stable and denies any issues of depression or anxiety associated with treatment of ADD. Except as noted above in the history of present illness, the review of systems is  negative for headache, vision changes, chest pain, shortness of breath, abdominal pain, urinary sx, bowel changes. Past medical, surgical, and social history reviewed and updated  Medications and allergies reviewed and updated        O: /72   Pulse 62   Temp 95.7 °F (35.4 °C)   Wt 198 lb (89.8 kg)   SpO2 98%   BMI 29.26 kg/m²   GEN: No acute distress, cooperative, well nourished, alert. HEENT: PEERLA, EOMI , normocephalic/atraumatic, nares and oropharynx clear. Mucous membranes normal, Tympanic membranes clear bilaterally. Neck: soft, supple, no thyromegaly, mass, no Lymphadenopathy  CV: Regular rate and rhythm, no murmur, rubs, gallops. No edema. Resp: Clear to auscultation bilaterally good air entry bilaterally  No crackles, wheeze. Breathing comfortably.    Psych: mood stable, No suicidal thoughts or ideation   Musc: full range of motion bilateral lower extremities. 2+ pulses bilateral lower extremities. Mild to moderate tender to palpation to base of heel/calcaneus. No sig tender to palpation over plantar fascia       Current Outpatient Medications   Medication Sig Dispense Refill    [START ON 5/21/2022] methylphenidate (CONCERTA) 54 MG extended release tablet Take 1 tablet by mouth every morning for 30 days. 30 tablet 0    [START ON 4/21/2022] methylphenidate (CONCERTA) 54 MG extended release tablet Take 1 tablet by mouth every morning for 30 days. 30 tablet 0    methylphenidate (CONCERTA) 54 MG extended release tablet Take 1 tablet by mouth every morning for 30 days. 30 tablet 0    [START ON 5/21/2022] methylphenidate (RITALIN) 5 MG tablet Take 1 tablet by mouth 3 times daily for 30 days. 90 tablet 0    [START ON 4/21/2022] methylphenidate (RITALIN) 5 MG tablet Take 1 tablet by mouth 3 times daily for 30 days. 90 tablet 0    methylphenidate (RITALIN) 5 MG tablet Take 1 tablet by mouth 3 times daily for 30 days. 90 tablet 0    diclofenac (VOLTAREN) 75 MG EC tablet TAKE 1 TABLET BY MOUTH TWICE DAILY 60 tablet 5    pantoprazole (PROTONIX) 40 MG tablet TAKE 1 TABLET BY MOUTH DAILY 30 tablet 5     No current facility-administered medications for this visit. ASSESSMENT / PLAN:    1. Chronic heel pain, right  Persistent sx, concern for possible stress fx  Check xrays as below  Management pending results. - XR CALCANEUS RIGHT (MIN 2 VIEWS); Future  - XR FOOT RIGHT (MIN 3 VIEWS); Future    2. Attention deficit disorder, unspecified hyperactivity presence  Stable  refills given for 3 months  Pt aware of need for every 3 month medication followup appointments, and that medication refills for benzodiazepines, narcotics and/or stimulants will only be given at appointment. - methylphenidate (CONCERTA) 54 MG extended release tablet; Take 1 tablet by mouth every morning for 30 days.   Dispense: 30 tablet; Refill: 0  - methylphenidate (CONCERTA) 54 MG extended release tablet; Take 1 tablet by mouth every morning for 30 days. Dispense: 30 tablet; Refill: 0  - methylphenidate (CONCERTA) 54 MG extended release tablet; Take 1 tablet by mouth every morning for 30 days. Dispense: 30 tablet; Refill: 0  - methylphenidate (RITALIN) 5 MG tablet; Take 1 tablet by mouth 3 times daily for 30 days. Dispense: 90 tablet; Refill: 0  - methylphenidate (RITALIN) 5 MG tablet; Take 1 tablet by mouth 3 times daily for 30 days. Dispense: 90 tablet; Refill: 0  - methylphenidate (RITALIN) 5 MG tablet; Take 1 tablet by mouth 3 times daily for 30 days. Dispense: 90 tablet; Refill: 0    3. Depression with anxiety  Stable w/o flare    4. Mild intermittent asthma without complication  Stable, monitor    5. Chronic bilateral low back pain without sciatica  Doing well w/o flare    6. Need for shingles vaccine  shingrix # 2 give today           Follow-up appointment:   Pending xrays  3 months  Prn     Discussed use, benefit, and side effects of all prescribed medications. Barriers to medication compliance addressed. All patient questions answered. Pt voiced understanding. When applicable, patient's outside records were reviewed through [facility]. The patient has signed appropriate paperworks/consents.

## 2022-07-12 RX ORDER — PANTOPRAZOLE SODIUM 40 MG/1
40 TABLET, DELAYED RELEASE ORAL DAILY
Qty: 30 TABLET | Refills: 5 | Status: SHIPPED | OUTPATIENT
Start: 2022-07-12 | End: 2022-07-20 | Stop reason: SDUPTHER

## 2022-07-12 RX ORDER — DICLOFENAC SODIUM 75 MG/1
TABLET, DELAYED RELEASE ORAL
Qty: 60 TABLET | Refills: 5 | Status: SHIPPED | OUTPATIENT
Start: 2022-07-12

## 2022-07-13 ENCOUNTER — PATIENT MESSAGE (OUTPATIENT)
Dept: FAMILY MEDICINE CLINIC | Age: 52
End: 2022-07-13

## 2022-07-14 NOTE — TELEPHONE ENCOUNTER
From: Rody Forth  To: Dr. Luda Grajeda: 7/13/2022 5:11 PM EDT  Subject: Prescription refill requests for methylphenidate (CONCERTA) 54 MG extended release tablet and methylphenidate (RITALIN) 5 MG tablet    Hi Dr Delicia Shea,  I had to cancel my appointment on June 21 because I tested positive for COVID-19 on June 18th. Sarah Ureña going on vacation 7/23 through 7/28 and the earliest available appointment you have is 7/26. Would it be possible get one months refills and schedule an appointment for the first available time after we return?   Thanks,  Koki Valverde

## 2022-07-20 ENCOUNTER — OFFICE VISIT (OUTPATIENT)
Dept: FAMILY MEDICINE CLINIC | Age: 52
End: 2022-07-20
Payer: COMMERCIAL

## 2022-07-20 VITALS
TEMPERATURE: 97.3 F | SYSTOLIC BLOOD PRESSURE: 110 MMHG | BODY MASS INDEX: 28.88 KG/M2 | DIASTOLIC BLOOD PRESSURE: 80 MMHG | WEIGHT: 195.4 LBS | OXYGEN SATURATION: 99 % | HEART RATE: 64 BPM | RESPIRATION RATE: 14 BRPM

## 2022-07-20 DIAGNOSIS — U07.1 COVID-19 VIRUS INFECTION: ICD-10-CM

## 2022-07-20 DIAGNOSIS — F41.8 DEPRESSION WITH ANXIETY: ICD-10-CM

## 2022-07-20 DIAGNOSIS — M79.671 CHRONIC HEEL PAIN, RIGHT: ICD-10-CM

## 2022-07-20 DIAGNOSIS — D22.9 NEVUS: ICD-10-CM

## 2022-07-20 DIAGNOSIS — J45.20 MILD INTERMITTENT ASTHMA WITHOUT COMPLICATION: ICD-10-CM

## 2022-07-20 DIAGNOSIS — G89.29 CHRONIC HEEL PAIN, RIGHT: ICD-10-CM

## 2022-07-20 DIAGNOSIS — F98.8 ATTENTION DEFICIT DISORDER, UNSPECIFIED HYPERACTIVITY PRESENCE: Primary | ICD-10-CM

## 2022-07-20 PROCEDURE — 99214 OFFICE O/P EST MOD 30 MIN: CPT | Performed by: FAMILY MEDICINE

## 2022-07-20 RX ORDER — METHYLPHENIDATE HYDROCHLORIDE 5 MG/1
5 TABLET ORAL 3 TIMES DAILY
Qty: 90 TABLET | Refills: 0 | Status: SHIPPED | OUTPATIENT
Start: 2022-08-19 | End: 2022-10-20 | Stop reason: SDUPTHER

## 2022-07-20 RX ORDER — METHYLPHENIDATE HYDROCHLORIDE 54 MG/1
54 TABLET ORAL EVERY MORNING
Qty: 30 TABLET | Refills: 0 | Status: SHIPPED | OUTPATIENT
Start: 2022-07-20 | End: 2022-10-20 | Stop reason: SDUPTHER

## 2022-07-20 RX ORDER — METHYLPHENIDATE HYDROCHLORIDE 54 MG/1
54 TABLET ORAL EVERY MORNING
Qty: 30 TABLET | Refills: 0 | Status: SHIPPED | OUTPATIENT
Start: 2022-09-18 | End: 2022-10-20 | Stop reason: SDUPTHER

## 2022-07-20 RX ORDER — METHYLPHENIDATE HYDROCHLORIDE 5 MG/1
5 TABLET ORAL 3 TIMES DAILY
Qty: 90 TABLET | Refills: 0 | Status: SHIPPED | OUTPATIENT
Start: 2022-09-18 | End: 2022-10-20 | Stop reason: SDUPTHER

## 2022-07-20 RX ORDER — PANTOPRAZOLE SODIUM 40 MG/1
40 TABLET, DELAYED RELEASE ORAL DAILY
Qty: 30 TABLET | Refills: 5 | Status: SHIPPED | OUTPATIENT
Start: 2022-07-20

## 2022-07-20 RX ORDER — METHYLPHENIDATE HYDROCHLORIDE 54 MG/1
54 TABLET ORAL EVERY MORNING
Qty: 30 TABLET | Refills: 0 | Status: SHIPPED | OUTPATIENT
Start: 2022-08-19 | End: 2022-10-20 | Stop reason: SDUPTHER

## 2022-07-20 RX ORDER — METHYLPHENIDATE HYDROCHLORIDE 5 MG/1
5 TABLET ORAL 3 TIMES DAILY
Qty: 90 TABLET | Refills: 0 | Status: SHIPPED | OUTPATIENT
Start: 2022-07-20 | End: 2022-10-20 | Stop reason: SDUPTHER

## 2022-07-20 SDOH — ECONOMIC STABILITY: FOOD INSECURITY: WITHIN THE PAST 12 MONTHS, YOU WORRIED THAT YOUR FOOD WOULD RUN OUT BEFORE YOU GOT MONEY TO BUY MORE.: NEVER TRUE

## 2022-07-20 SDOH — ECONOMIC STABILITY: FOOD INSECURITY: WITHIN THE PAST 12 MONTHS, THE FOOD YOU BOUGHT JUST DIDN'T LAST AND YOU DIDN'T HAVE MONEY TO GET MORE.: NEVER TRUE

## 2022-07-20 ASSESSMENT — SOCIAL DETERMINANTS OF HEALTH (SDOH): HOW HARD IS IT FOR YOU TO PAY FOR THE VERY BASICS LIKE FOOD, HOUSING, MEDICAL CARE, AND HEATING?: NOT HARD AT ALL

## 2022-07-20 NOTE — PROGRESS NOTES
Here for f/u and recheck of ADD, recent COVID infection    Pt states that he did test + for COVID a few weeks ago. Pt started with some malaise, low grade fever and sore throat. Pt did test + but sx were very mild. Min HA and no loss of smell/taste. Never had any chest congestion and very minimal cough. Pt did quarantine and is back to feeling well. Pt did not have any issues of shortness of breath or flare. Pt states he felt that his symptoms were very mild overall and never felt that poorly     Here for follow up of ADD. Pt states that they are doing very well with current therapy and feels that control of symptoms are adequate at this time. No breakthru symptoms and no need/indication for adjustment in therapy. Taking meds as prescribed and denies any illicit medication usage of misuse of their stimulant thearpy. Mood is stable and denies any issues of depression or anxiety associated with treatment of ADD. Pt did get over to foot specialist for eval of some chronic R heel pain. Pts xrays were ok and they did ultrasound, and did get cortisone injections with no benefit. Sx continue to be moderate to severe. Pt has been active with playing softball and wonders if that has flared up symptoms. Pt is using arch supports. Except as noted above in the history of present illness, the review of systems is  negative for headache, vision changes, chest pain, shortness of breath, abdominal pain, urinary sx, bowel changes. Past medical, surgical, and social history reviewed and updated  Medications and allergies reviewed and updated      O: /80   Pulse 64   Temp 97.3 °F (36.3 °C) (Temporal)   Resp 14   Wt 195 lb 6.4 oz (88.6 kg)   SpO2 99%   BMI 28.88 kg/m²   GEN: No acute distress, cooperative, well nourished, alert. HEENT: PEERLA, EOMI , normocephalic/atraumatic, nares and oropharynx clear. Mucous membranes normal, Tympanic membranes clear bilaterally.   Neck: soft, supple, no thyromegaly, mass, no Lymphadenopathy  CV: Regular rate and rhythm, no murmur, rubs, gallops. No edema. Resp: Clear to auscultation bilaterally good air entry bilaterally  No crackles, wheeze. Breathing comfortably. Psych: mood stable, No suicidal thoughts or ideation   Musc: mild tender to palpation to medial heel on R. Neurovascularly intact   Skin: R lateral side with increasing size nevus with raised borders, mild irritation. No bleeding, discharge      Current Outpatient Medications   Medication Sig Dispense Refill    pantoprazole (PROTONIX) 40 MG tablet Take 1 tablet by mouth in the morning. 30 tablet 5    [START ON 9/18/2022] methylphenidate (CONCERTA) 54 MG extended release tablet Take 1 tablet by mouth every morning for 30 days. 30 tablet 0    [START ON 8/19/2022] methylphenidate (CONCERTA) 54 MG extended release tablet Take 1 tablet by mouth every morning for 30 days. 30 tablet 0    methylphenidate (CONCERTA) 54 MG extended release tablet Take 1 tablet by mouth every morning for 30 days. 30 tablet 0    [START ON 9/18/2022] methylphenidate (RITALIN) 5 MG tablet Take 1 tablet by mouth in the morning and 1 tablet at noon and 1 tablet before bedtime. Do all this for 30 days. 90 tablet 0    [START ON 8/19/2022] methylphenidate (RITALIN) 5 MG tablet Take 1 tablet by mouth in the morning and 1 tablet at noon and 1 tablet before bedtime. Do all this for 30 days. 90 tablet 0    methylphenidate (RITALIN) 5 MG tablet Take 1 tablet by mouth in the morning and 1 tablet at noon and 1 tablet before bedtime. Do all this for 30 days. 90 tablet 0    diclofenac (VOLTAREN) 75 MG EC tablet TAKE 1 TABLET BY MOUTH TWICE DAILY 60 tablet 5     No current facility-administered medications for this visit. ASSESSMENT / PLAN:    1.  Attention deficit disorder, unspecified hyperactivity presence  Stable w/ current therapy  refills given for 3 months  Pt aware of need for every 3 month medication followup appointments, and that medication refills for benzodiazepines, narcotics and/or stimulants will only be given at appointment. - methylphenidate (CONCERTA) 54 MG extended release tablet; Take 1 tablet by mouth every morning for 30 days. Dispense: 30 tablet; Refill: 0  - methylphenidate (CONCERTA) 54 MG extended release tablet; Take 1 tablet by mouth every morning for 30 days. Dispense: 30 tablet; Refill: 0  - methylphenidate (CONCERTA) 54 MG extended release tablet; Take 1 tablet by mouth every morning for 30 days. Dispense: 30 tablet; Refill: 0  - methylphenidate (RITALIN) 5 MG tablet; Take 1 tablet by mouth in the morning and 1 tablet at noon and 1 tablet before bedtime. Do all this for 30 days. Dispense: 90 tablet; Refill: 0  - methylphenidate (RITALIN) 5 MG tablet; Take 1 tablet by mouth in the morning and 1 tablet at noon and 1 tablet before bedtime. Do all this for 30 days. Dispense: 90 tablet; Refill: 0  - methylphenidate (RITALIN) 5 MG tablet; Take 1 tablet by mouth in the morning and 1 tablet at noon and 1 tablet before bedtime. Do all this for 30 days. Dispense: 90 tablet; Refill: 0    2. Depression with anxiety  Stable w/o flare  Cont supportive thearpy    3. Mild intermittent asthma without complication  Stable w/o flare    4. COVID-19 virus infection  Resolved  No persistent sx  Cont supportive therapy    5. Chronic heel pain, right  Persistent sx  S/p xrays, eval by podiatry and range of motion exercises  Check MRI foot  Management pending results. - MRI FOOT RIGHT WO CONTRAST; Future    6. Nevus  Increasing in size, will have pt set up f/u for reeval and shave bx  Management pending results. Follow-up appointment:   3 months/prn     Discussed use, benefit, and side effects of all prescribed medications. Barriers to medication compliance addressed. All patient questions answered. Pt voiced understanding. When applicable, patient's outside records were reviewed through [facility].   The patient has signed appropriate paperworks/consents.

## 2022-08-03 ENCOUNTER — OFFICE VISIT (OUTPATIENT)
Dept: FAMILY MEDICINE CLINIC | Age: 52
End: 2022-08-03
Payer: COMMERCIAL

## 2022-08-03 VITALS
DIASTOLIC BLOOD PRESSURE: 72 MMHG | OXYGEN SATURATION: 99 % | BODY MASS INDEX: 28.67 KG/M2 | HEART RATE: 62 BPM | SYSTOLIC BLOOD PRESSURE: 114 MMHG | RESPIRATION RATE: 12 BRPM | WEIGHT: 194 LBS | TEMPERATURE: 97.8 F

## 2022-08-03 DIAGNOSIS — D22.9 NEVUS: Primary | ICD-10-CM

## 2022-08-03 PROCEDURE — 99213 OFFICE O/P EST LOW 20 MIN: CPT | Performed by: FAMILY MEDICINE

## 2022-08-03 PROCEDURE — 11307 SHAVE SKIN LESION 1.1-2.0 CM: CPT | Performed by: FAMILY MEDICINE

## 2022-08-03 ASSESSMENT — PATIENT HEALTH QUESTIONNAIRE - PHQ9
SUM OF ALL RESPONSES TO PHQ QUESTIONS 1-9: 0
8. MOVING OR SPEAKING SO SLOWLY THAT OTHER PEOPLE COULD HAVE NOTICED. OR THE OPPOSITE, BEING SO FIGETY OR RESTLESS THAT YOU HAVE BEEN MOVING AROUND A LOT MORE THAN USUAL: 0
9. THOUGHTS THAT YOU WOULD BE BETTER OFF DEAD, OR OF HURTING YOURSELF: 0
3. TROUBLE FALLING OR STAYING ASLEEP: 0
SUM OF ALL RESPONSES TO PHQ QUESTIONS 1-9: 0
SUM OF ALL RESPONSES TO PHQ9 QUESTIONS 1 & 2: 0
7. TROUBLE CONCENTRATING ON THINGS, SUCH AS READING THE NEWSPAPER OR WATCHING TELEVISION: 0
6. FEELING BAD ABOUT YOURSELF - OR THAT YOU ARE A FAILURE OR HAVE LET YOURSELF OR YOUR FAMILY DOWN: 0
2. FEELING DOWN, DEPRESSED OR HOPELESS: 0
4. FEELING TIRED OR HAVING LITTLE ENERGY: 0
5. POOR APPETITE OR OVEREATING: 0
SUM OF ALL RESPONSES TO PHQ QUESTIONS 1-9: 0
1. LITTLE INTEREST OR PLEASURE IN DOING THINGS: 0
SUM OF ALL RESPONSES TO PHQ QUESTIONS 1-9: 0

## 2022-08-03 NOTE — PROGRESS NOTES
Here for f/u and recheck of skin tag/nevus here for removal.  Pt has some irritation to the area and feels that it is getting bigger. Except as noted above in the history of present illness, the review of systems is  negative for headache, vision changes, chest pain, shortness of breath, abdominal pain, urinary sx, bowel changes. Past medical, surgical, and social history reviewed and updated  Medications and allergies reviewed and updated      O: /72   Pulse 62   Temp 97.8 °F (36.6 °C) (Temporal)   Resp 12   Wt 194 lb (88 kg)   SpO2 99%   BMI 28.67 kg/m²   GEN: No acute distress, cooperative, well nourished, alert. Resp: Clear to auscultation bilaterally good air entry bilaterally  No crackles, wheeze. Breathing comfortably. Psych: mood stable, No suicidal thoughts or ideation   Skin R mild abdomen side with 1.2cm raised, brownish irritated nevus w/o bleeding/discharge      ASSESSMENT / PLAN:    1. Nevus  Area cleaned, anesthesized with 2.0 cc lidocaine with epinephrine. Shave biopsy of lesion without complications. Estimated blood loss none. Silver nitrate cautery. Lesion to pathology. Local care discussed. - 98642 - IA SHAV SKIN LES 11-20MM REMAINDR BODY  - Surgical Pathology           Follow-up appointment:   Pending path/prn    Discussed use, benefit, and side effects of all prescribed medications. Barriers to medication compliance addressed. All patient questions answered. Pt voiced understanding. When applicable, patient's outside records were reviewed through [facility]. The patient has signed appropriate paperworks/consents.

## 2022-10-20 ENCOUNTER — OFFICE VISIT (OUTPATIENT)
Dept: FAMILY MEDICINE CLINIC | Age: 52
End: 2022-10-20
Payer: COMMERCIAL

## 2022-10-20 VITALS
OXYGEN SATURATION: 96 % | HEART RATE: 72 BPM | SYSTOLIC BLOOD PRESSURE: 116 MMHG | RESPIRATION RATE: 13 BRPM | DIASTOLIC BLOOD PRESSURE: 86 MMHG | BODY MASS INDEX: 29.56 KG/M2 | TEMPERATURE: 97.6 F | WEIGHT: 200 LBS

## 2022-10-20 DIAGNOSIS — F41.8 DEPRESSION WITH ANXIETY: Primary | ICD-10-CM

## 2022-10-20 DIAGNOSIS — J30.89 NON-SEASONAL ALLERGIC RHINITIS, UNSPECIFIED TRIGGER: ICD-10-CM

## 2022-10-20 DIAGNOSIS — F98.8 ATTENTION DEFICIT DISORDER, UNSPECIFIED HYPERACTIVITY PRESENCE: ICD-10-CM

## 2022-10-20 DIAGNOSIS — K21.00 GASTROESOPHAGEAL REFLUX DISEASE WITH ESOPHAGITIS WITHOUT HEMORRHAGE: ICD-10-CM

## 2022-10-20 PROCEDURE — 99214 OFFICE O/P EST MOD 30 MIN: CPT | Performed by: FAMILY MEDICINE

## 2022-10-20 RX ORDER — METHYLPHENIDATE HYDROCHLORIDE 54 MG/1
54 TABLET ORAL EVERY MORNING
Qty: 30 TABLET | Refills: 0 | Status: SHIPPED | OUTPATIENT
Start: 2022-11-19 | End: 2022-12-19

## 2022-10-20 RX ORDER — METHYLPHENIDATE HYDROCHLORIDE 5 MG/1
5 TABLET ORAL 3 TIMES DAILY
Qty: 90 TABLET | Refills: 0 | Status: SHIPPED | OUTPATIENT
Start: 2022-10-20 | End: 2022-11-19

## 2022-10-20 RX ORDER — METHYLPHENIDATE HYDROCHLORIDE 5 MG/1
5 TABLET ORAL 3 TIMES DAILY
Qty: 90 TABLET | Refills: 0 | Status: SHIPPED | OUTPATIENT
Start: 2022-12-19 | End: 2023-01-18

## 2022-10-20 RX ORDER — METHYLPHENIDATE HYDROCHLORIDE 54 MG/1
54 TABLET ORAL EVERY MORNING
Qty: 30 TABLET | Refills: 0 | Status: SHIPPED | OUTPATIENT
Start: 2022-12-19 | End: 2023-01-18

## 2022-10-20 RX ORDER — METHYLPHENIDATE HYDROCHLORIDE 5 MG/1
5 TABLET ORAL 3 TIMES DAILY
Qty: 90 TABLET | Refills: 0 | Status: SHIPPED | OUTPATIENT
Start: 2022-11-19 | End: 2022-12-19

## 2022-10-20 RX ORDER — METHYLPHENIDATE HYDROCHLORIDE 54 MG/1
54 TABLET ORAL EVERY MORNING
Qty: 30 TABLET | Refills: 0 | Status: SHIPPED | OUTPATIENT
Start: 2022-10-20 | End: 2022-11-19

## 2022-10-20 NOTE — PROGRESS NOTES
Here for f/u and recheck of ADD, mood    Pt states that things are doing well, busy with kids and work. Pts daughter is playing soccer, they have practice 3x per week and games. Does have to do some travel intermittently but usually pretty local    Pt is feeling well, has noted some mild flare of allergies but no acute URI sx, no concerns. Some mild nasal congestion. Pt taking nothing for this as sx are intermittent and pretty mild. Breathing is doing well. Here for follow up of ADD. Pt states that they are doing very well with current therapy and feels that control of symptoms are adequate at this time. No breakthru symptoms and no need/indication for adjustment in therapy. Taking meds as prescribed and denies any illicit medication usage of misuse of their stimulant thearpy. Mood is stable and denies any issues of depression or anxiety associated with treatment of ADD. Except as noted above in the history of present illness, the review of systems is  negative for headache, vision changes, chest pain, shortness of breath, abdominal pain, urinary sx, bowel changes. Past medical, surgical, and social history reviewed and updated  Medications and allergies reviewed and updated      O: /86   Pulse 72   Temp 97.6 °F (36.4 °C) (Temporal)   Resp 13   Wt 200 lb (90.7 kg)   SpO2 96%   BMI 29.56 kg/m²   GEN: No acute distress, cooperative, well nourished, alert. HEENT: PEERLA, EOMI , normocephalic/atraumatic, nares and oropharynx clear. Mucous membranes normal, Tympanic membranes clear bilaterally. Neck: soft, supple, no thyromegaly, mass, no Lymphadenopathy  CV: Regular rate and rhythm, no murmur, rubs, gallops. No edema. Resp: Clear to auscultation bilaterally good air entry bilaterally  No crackles, wheeze. Breathing comfortably.    Psych: mood stable, No suicidal thoughts or ideation         Current Outpatient Medications   Medication Sig Dispense Refill    pantoprazole (PROTONIX) 40 MG tablet Take 1 tablet by mouth in the morning. 30 tablet 5    diclofenac (VOLTAREN) 75 MG EC tablet TAKE 1 TABLET BY MOUTH TWICE DAILY 60 tablet 5    methylphenidate (CONCERTA) 54 MG extended release tablet Take 1 tablet by mouth every morning for 30 days. 30 tablet 0    methylphenidate (CONCERTA) 54 MG extended release tablet Take 1 tablet by mouth every morning for 30 days. 30 tablet 0    methylphenidate (CONCERTA) 54 MG extended release tablet Take 1 tablet by mouth every morning for 30 days. 30 tablet 0    methylphenidate (RITALIN) 5 MG tablet Take 1 tablet by mouth in the morning and 1 tablet at noon and 1 tablet before bedtime. Do all this for 30 days. 90 tablet 0    methylphenidate (RITALIN) 5 MG tablet Take 1 tablet by mouth in the morning and 1 tablet at noon and 1 tablet before bedtime. Do all this for 30 days. 90 tablet 0    methylphenidate (RITALIN) 5 MG tablet Take 1 tablet by mouth in the morning and 1 tablet at noon and 1 tablet before bedtime. Do all this for 30 days. 90 tablet 0     No current facility-administered medications for this visit. ASSESSMENT / PLAN:    1. Attention deficit disorder, unspecified hyperactivity presence  Stable with concerta and ritalin  refills given for 3 months  Pt aware of need for every 3 month medication followup appointments, and that medication refills for benzodiazepines, narcotics and/or stimulants will only be given at appointment. - methylphenidate (CONCERTA) 54 MG extended release tablet; Take 1 tablet by mouth every morning for 30 days. Dispense: 30 tablet; Refill: 0  - methylphenidate (CONCERTA) 54 MG extended release tablet; Take 1 tablet by mouth every morning for 30 days. Dispense: 30 tablet; Refill: 0  - methylphenidate (CONCERTA) 54 MG extended release tablet; Take 1 tablet by mouth every morning for 30 days. Dispense: 30 tablet; Refill: 0  - methylphenidate (RITALIN) 5 MG tablet; Take 1 tablet by mouth 3 times daily for 30 days. Dispense: 90 tablet; Refill: 0  - methylphenidate (RITALIN) 5 MG tablet; Take 1 tablet by mouth 3 times daily for 30 days. Dispense: 90 tablet; Refill: 0  - methylphenidate (RITALIN) 5 MG tablet; Take 1 tablet by mouth 3 times daily for 30 days. Dispense: 90 tablet; Refill: 0    2. Depression with anxiety  stable    3. Non-seasonal allergic rhinitis, unspecified trigger  Cont supportive/over therapy    4. Gastroesophageal reflux disease with esophagitis without hemorrhage  Cont PPI therapy  Doing well           Follow-up appointment:   3 mos/prn    Discussed use, benefit, and side effects of all prescribed medications. Barriers to medication compliance addressed. All patient questions answered. Pt voiced understanding. When applicable, patient's outside records were reviewed through [facility]. The patient has signed appropriate paperworks/consents.

## 2023-01-17 RX ORDER — PANTOPRAZOLE SODIUM 40 MG/1
TABLET, DELAYED RELEASE ORAL
Qty: 30 TABLET | Refills: 5 | Status: SHIPPED | OUTPATIENT
Start: 2023-01-17

## 2023-01-17 RX ORDER — DICLOFENAC SODIUM 75 MG/1
TABLET, DELAYED RELEASE ORAL
Qty: 60 TABLET | Refills: 5 | Status: SHIPPED | OUTPATIENT
Start: 2023-01-17

## 2023-01-17 NOTE — TELEPHONE ENCOUNTER
Requested Prescriptions     Pending Prescriptions Disp Refills    pantoprazole (PROTONIX) 40 MG tablet [Pharmacy Med Name: PANTOPRAZOLE 40MG TABLETS] 30 tablet 5     Sig: TAKE 1 TABLET BY MOUTH DAILY     Last Office Visit: 10/20/2022     Next Office Visit: 1/20/2023     Last Labs: 4/12/21

## 2023-01-20 ENCOUNTER — OFFICE VISIT (OUTPATIENT)
Dept: FAMILY MEDICINE CLINIC | Age: 53
End: 2023-01-20

## 2023-01-20 VITALS
WEIGHT: 205.2 LBS | DIASTOLIC BLOOD PRESSURE: 80 MMHG | BODY MASS INDEX: 31.1 KG/M2 | SYSTOLIC BLOOD PRESSURE: 130 MMHG | OXYGEN SATURATION: 96 % | HEIGHT: 68 IN | HEART RATE: 65 BPM

## 2023-01-20 DIAGNOSIS — Z79.899 HIGH RISK MEDICATION USE: ICD-10-CM

## 2023-01-20 DIAGNOSIS — R03.0 ELEVATED BLOOD PRESSURE READING: ICD-10-CM

## 2023-01-20 DIAGNOSIS — Z00.00 ROUTINE GENERAL MEDICAL EXAMINATION AT A HEALTH CARE FACILITY: ICD-10-CM

## 2023-01-20 DIAGNOSIS — Z12.5 SCREENING FOR PROSTATE CANCER: ICD-10-CM

## 2023-01-20 DIAGNOSIS — Z13.6 ISCHEMIC HEART DISEASE SCREEN: ICD-10-CM

## 2023-01-20 DIAGNOSIS — J45.20 MILD INTERMITTENT ASTHMA WITHOUT COMPLICATION: ICD-10-CM

## 2023-01-20 DIAGNOSIS — K21.00 GASTROESOPHAGEAL REFLUX DISEASE WITH ESOPHAGITIS WITHOUT HEMORRHAGE: ICD-10-CM

## 2023-01-20 DIAGNOSIS — F98.8 ATTENTION DEFICIT DISORDER, UNSPECIFIED HYPERACTIVITY PRESENCE: Primary | ICD-10-CM

## 2023-01-20 DIAGNOSIS — F41.8 DEPRESSION WITH ANXIETY: ICD-10-CM

## 2023-01-20 RX ORDER — METHYLPHENIDATE HYDROCHLORIDE 54 MG/1
54 TABLET ORAL EVERY MORNING
Qty: 30 TABLET | Refills: 0 | Status: SHIPPED | OUTPATIENT
Start: 2023-03-21 | End: 2023-04-20

## 2023-01-20 RX ORDER — METHYLPHENIDATE HYDROCHLORIDE 5 MG/1
5 TABLET ORAL 3 TIMES DAILY
Qty: 90 TABLET | Refills: 0 | Status: SHIPPED | OUTPATIENT
Start: 2023-02-19 | End: 2023-03-21

## 2023-01-20 RX ORDER — METHYLPHENIDATE HYDROCHLORIDE 54 MG/1
54 TABLET ORAL EVERY MORNING
Qty: 30 TABLET | Refills: 0 | Status: SHIPPED | OUTPATIENT
Start: 2023-02-19 | End: 2023-03-21

## 2023-01-20 RX ORDER — METHYLPHENIDATE HYDROCHLORIDE 5 MG/1
5 TABLET ORAL 3 TIMES DAILY
Qty: 90 TABLET | Refills: 0 | Status: SHIPPED | OUTPATIENT
Start: 2023-01-20 | End: 2023-02-19

## 2023-01-20 RX ORDER — METHYLPHENIDATE HYDROCHLORIDE 54 MG/1
54 TABLET ORAL EVERY MORNING
Qty: 30 TABLET | Refills: 0 | Status: CANCELLED | OUTPATIENT
Start: 2023-01-20 | End: 2023-02-19

## 2023-01-20 RX ORDER — METHYLPHENIDATE HYDROCHLORIDE 5 MG/1
5 TABLET ORAL 3 TIMES DAILY
Qty: 90 TABLET | Refills: 0 | Status: SHIPPED | OUTPATIENT
Start: 2023-03-21 | End: 2023-04-20

## 2023-01-20 RX ORDER — METHYLPHENIDATE HYDROCHLORIDE 54 MG/1
54 TABLET ORAL EVERY MORNING
Qty: 30 TABLET | Refills: 0 | Status: SHIPPED | OUTPATIENT
Start: 2023-01-20 | End: 2023-02-19

## 2023-01-20 ASSESSMENT — PATIENT HEALTH QUESTIONNAIRE - PHQ9
7. TROUBLE CONCENTRATING ON THINGS, SUCH AS READING THE NEWSPAPER OR WATCHING TELEVISION: 0
SUM OF ALL RESPONSES TO PHQ QUESTIONS 1-9: 0
4. FEELING TIRED OR HAVING LITTLE ENERGY: 0
2. FEELING DOWN, DEPRESSED OR HOPELESS: 0
8. MOVING OR SPEAKING SO SLOWLY THAT OTHER PEOPLE COULD HAVE NOTICED. OR THE OPPOSITE, BEING SO FIGETY OR RESTLESS THAT YOU HAVE BEEN MOVING AROUND A LOT MORE THAN USUAL: 0
6. FEELING BAD ABOUT YOURSELF - OR THAT YOU ARE A FAILURE OR HAVE LET YOURSELF OR YOUR FAMILY DOWN: 0
SUM OF ALL RESPONSES TO PHQ QUESTIONS 1-9: 0
3. TROUBLE FALLING OR STAYING ASLEEP: 0
SUM OF ALL RESPONSES TO PHQ9 QUESTIONS 1 & 2: 0
9. THOUGHTS THAT YOU WOULD BE BETTER OFF DEAD, OR OF HURTING YOURSELF: 0
1. LITTLE INTEREST OR PLEASURE IN DOING THINGS: 0
SUM OF ALL RESPONSES TO PHQ QUESTIONS 1-9: 0
10. IF YOU CHECKED OFF ANY PROBLEMS, HOW DIFFICULT HAVE THESE PROBLEMS MADE IT FOR YOU TO DO YOUR WORK, TAKE CARE OF THINGS AT HOME, OR GET ALONG WITH OTHER PEOPLE: 0
SUM OF ALL RESPONSES TO PHQ QUESTIONS 1-9: 0
5. POOR APPETITE OR OVEREATING: 0

## 2023-01-20 NOTE — PROGRESS NOTES
Here for f/u and recheck of mood, ADD    Pt states that things are doing well, did recently move. Pt states that they were in [place], was renting and considering buying down the road. They were told in 10/2022 that they would have to be moving out in the next few months, and ended up moving into a rental in [place], and that did ok overall. Pt managed the stress of the move overall. Here for follow up of ADD. Pt states that they are doing very well with current therapy and feels that control of symptoms are adequate at this time. No breakthru symptoms and no need/indication for adjustment in therapy. Taking meds as prescribed and denies any illicit medication usage of misuse of their stimulant thearpy. Mood is stable and denies any issues of depression or anxiety associated with treatment of ADD. Except as noted above in the history of present illness, the review of systems is  negative for headache, vision changes, chest pain, shortness of breath, abdominal pain, urinary sx, bowel changes. Past medical, surgical, and social history reviewed and updated  Medications and allergies reviewed and updated        O: /80 (Site: Left Upper Arm, Position: Sitting, Cuff Size: Large Adult)   Pulse 65   Ht 5' 8\" (1.727 m)   Wt 205 lb 3.2 oz (93.1 kg)   SpO2 96%   BMI 31.20 kg/m²   GEN: No acute distress, cooperative, well nourished, alert. HEENT: PEERLA, EOMI , normocephalic/atraumatic, nares and oropharynx clear. Mucous membranes normal, Tympanic membranes clear bilaterally. Neck: soft, supple, no thyromegaly, mass, no Lymphadenopathy  CV: Regular rate and rhythm, no murmur, rubs, gallops. No edema. Resp: Clear to auscultation bilaterally good air entry bilaterally  No crackles, wheeze. Breathing comfortably.    Psych: mood stable, No suicidal thoughts or ideation         Current Outpatient Medications   Medication Sig Dispense Refill    [START ON 3/21/2023] methylphenidate (CONCERTA) 54 MG extended release tablet Take 1 tablet by mouth every morning for 30 days. 30 tablet 0    [START ON 2/19/2023] methylphenidate (CONCERTA) 54 MG extended release tablet Take 1 tablet by mouth every morning for 30 days. 30 tablet 0    methylphenidate (CONCERTA) 54 MG extended release tablet Take 1 tablet by mouth every morning for 30 days. 30 tablet 0    [START ON 3/21/2023] methylphenidate (RITALIN) 5 MG tablet Take 1 tablet by mouth 3 times daily for 30 days. 90 tablet 0    [START ON 2/19/2023] methylphenidate (RITALIN) 5 MG tablet Take 1 tablet by mouth 3 times daily for 30 days. 90 tablet 0    methylphenidate (RITALIN) 5 MG tablet Take 1 tablet by mouth 3 times daily for 30 days. 90 tablet 0    diclofenac (VOLTAREN) 75 MG EC tablet TAKE 1 TABLET BY MOUTH TWICE DAILY 60 tablet 5    pantoprazole (PROTONIX) 40 MG tablet TAKE 1 TABLET BY MOUTH DAILY 30 tablet 5     No current facility-administered medications for this visit. ASSESSMENT / PLAN:    1. Attention deficit disorder, unspecified hyperactivity presence  Stable w/ current therapy  refills gien for 3 months of concerta and riatlin  Use chronic  See [facility]  Pt aware of need for every 3 month medication followup appointments, and that medication refills for benzodiazepines, narcotics and/or stimulants will only be given at appointment. - methylphenidate (CONCERTA) 54 MG extended release tablet; Take 1 tablet by mouth every morning for 30 days. Dispense: 30 tablet; Refill: 0  - methylphenidate (CONCERTA) 54 MG extended release tablet; Take 1 tablet by mouth every morning for 30 days. Dispense: 30 tablet; Refill: 0  - methylphenidate (CONCERTA) 54 MG extended release tablet; Take 1 tablet by mouth every morning for 30 days. Dispense: 30 tablet; Refill: 0  - methylphenidate (RITALIN) 5 MG tablet; Take 1 tablet by mouth 3 times daily for 30 days. Dispense: 90 tablet; Refill: 0  - methylphenidate (RITALIN) 5 MG tablet;  Take 1 tablet by mouth 3 times daily for 30 days. Dispense: 90 tablet; Refill: 0  - methylphenidate (RITALIN) 5 MG tablet; Take 1 tablet by mouth 3 times daily for 30 days. Dispense: 90 tablet; Refill: 0    2. Depression with anxiety  Mood stable, doing well  Cont supportive therapy    3. Gastroesophageal reflux disease with esophagitis without hemorrhage  Stable w/o progressive sx  Cont protonix and montor    4. Screening for prostate cancer  Check psa    5. High risk medication use  Side effects of stimulant medications discussed, including anxiety, irritability , decreased appetite , weight loss, hypertension, palpitations. Patient aware of addiction potential and need to  medication prescriptions at office at time of appointment. 6. Elevated blood pressure reading  blood pressure stable @ goal, controlled    7. Routine general medical examination at a health care facility  - CBC; Future  - Comprehensive Metabolic Panel; Future  - Lipid Panel; Future  - TSH; Future  - PSA, Prostatic Specific Antigen; Future  - Hemoglobin A1C; Future    8. Mild intermittent asthma without complication  Stable w/o flare    9. Ischemic heart disease screen  - CT CARDIAC CALCIUM SCORING; Future           Follow-up appointment:   Pending CT scan  Pending bloodwork  3 months  Prn     Discussed use, benefit, and side effects of all prescribed medications. Barriers to medication compliance addressed. All patient questions answered. Pt voiced understanding. When applicable, patient's outside records were reviewed through [facility]. The patient has signed appropriate paperworks/consents.

## 2023-01-26 ENCOUNTER — TELEPHONE (OUTPATIENT)
Dept: FAMILY MEDICINE CLINIC | Age: 53
End: 2023-01-26

## 2023-01-31 ENCOUNTER — TELEPHONE (OUTPATIENT)
Dept: FAMILY MEDICINE CLINIC | Age: 53
End: 2023-01-31

## 2023-02-02 NOTE — TELEPHONE ENCOUNTER
Received a fax from [facility] asking for more information concerning Concerta. Filled out form and faxed to [phone]. Letter attached.

## 2023-02-07 NOTE — TELEPHONE ENCOUNTER
DENIED. LETTER ATTACHED. If this requires a response please respond to the pool. [address]). Please advise patient thank you.

## 2023-02-09 ENCOUNTER — TELEPHONE (OUTPATIENT)
Dept: FAMILY MEDICINE CLINIC | Age: 53
End: 2023-02-09

## 2023-04-20 ENCOUNTER — OFFICE VISIT (OUTPATIENT)
Dept: FAMILY MEDICINE CLINIC | Age: 53
End: 2023-04-20
Payer: COMMERCIAL

## 2023-04-20 VITALS
HEART RATE: 70 BPM | WEIGHT: 205 LBS | DIASTOLIC BLOOD PRESSURE: 78 MMHG | SYSTOLIC BLOOD PRESSURE: 124 MMHG | TEMPERATURE: 97.9 F | OXYGEN SATURATION: 100 % | RESPIRATION RATE: 12 BRPM | BODY MASS INDEX: 31.17 KG/M2

## 2023-04-20 DIAGNOSIS — R03.0 ELEVATED BLOOD PRESSURE READING: ICD-10-CM

## 2023-04-20 DIAGNOSIS — Z13.6 ISCHEMIC HEART DISEASE SCREEN: ICD-10-CM

## 2023-04-20 DIAGNOSIS — F41.8 DEPRESSION WITH ANXIETY: Primary | ICD-10-CM

## 2023-04-20 DIAGNOSIS — F98.8 ATTENTION DEFICIT DISORDER, UNSPECIFIED HYPERACTIVITY PRESENCE: ICD-10-CM

## 2023-04-20 DIAGNOSIS — Z87.39 HISTORY OF ACUTE JRA: ICD-10-CM

## 2023-04-20 PROCEDURE — 99214 OFFICE O/P EST MOD 30 MIN: CPT | Performed by: FAMILY MEDICINE

## 2023-04-20 RX ORDER — METHYLPHENIDATE HYDROCHLORIDE 5 MG/1
5 TABLET ORAL 3 TIMES DAILY
Qty: 90 TABLET | Refills: 0 | Status: CANCELLED | OUTPATIENT
Start: 2023-06-19 | End: 2023-07-19

## 2023-04-20 RX ORDER — METHYLPHENIDATE HYDROCHLORIDE 5 MG/1
5 TABLET ORAL 3 TIMES DAILY
Qty: 90 TABLET | Refills: 0 | Status: SHIPPED | OUTPATIENT
Start: 2023-04-20 | End: 2023-05-20

## 2023-04-20 RX ORDER — METHYLPHENIDATE HYDROCHLORIDE 54 MG/1
54 TABLET ORAL EVERY MORNING
Qty: 30 TABLET | Refills: 0 | Status: CANCELLED | OUTPATIENT
Start: 2023-04-20 | End: 2023-05-20

## 2023-04-20 RX ORDER — AMPHETAMINE 15.7 MG/1
2 TABLET, ORALLY DISINTEGRATING ORAL DAILY
Qty: 60 EACH | Refills: 0 | Status: SHIPPED | OUTPATIENT
Start: 2023-04-20 | End: 2023-05-20

## 2023-04-20 RX ORDER — METHYLPHENIDATE HYDROCHLORIDE 54 MG/1
54 TABLET ORAL EVERY MORNING
Qty: 30 TABLET | Refills: 0 | Status: CANCELLED | OUTPATIENT
Start: 2023-06-19 | End: 2023-07-19

## 2023-04-20 RX ORDER — METHYLPHENIDATE HYDROCHLORIDE 5 MG/1
5 TABLET ORAL 3 TIMES DAILY
Qty: 90 TABLET | Refills: 0 | Status: CANCELLED | OUTPATIENT
Start: 2023-05-20 | End: 2023-06-19

## 2023-04-20 RX ORDER — METHYLPHENIDATE HYDROCHLORIDE 54 MG/1
54 TABLET ORAL EVERY MORNING
Qty: 30 TABLET | Refills: 0 | Status: CANCELLED | OUTPATIENT
Start: 2023-05-20 | End: 2023-06-19

## 2023-04-20 SDOH — ECONOMIC STABILITY: INCOME INSECURITY: HOW HARD IS IT FOR YOU TO PAY FOR THE VERY BASICS LIKE FOOD, HOUSING, MEDICAL CARE, AND HEATING?: NOT HARD AT ALL

## 2023-04-20 SDOH — ECONOMIC STABILITY: HOUSING INSECURITY
IN THE LAST 12 MONTHS, WAS THERE A TIME WHEN YOU DID NOT HAVE A STEADY PLACE TO SLEEP OR SLEPT IN A SHELTER (INCLUDING NOW)?: NO

## 2023-04-20 SDOH — ECONOMIC STABILITY: FOOD INSECURITY: WITHIN THE PAST 12 MONTHS, THE FOOD YOU BOUGHT JUST DIDN'T LAST AND YOU DIDN'T HAVE MONEY TO GET MORE.: NEVER TRUE

## 2023-04-20 SDOH — ECONOMIC STABILITY: FOOD INSECURITY: WITHIN THE PAST 12 MONTHS, YOU WORRIED THAT YOUR FOOD WOULD RUN OUT BEFORE YOU GOT MONEY TO BUY MORE.: NEVER TRUE

## 2023-04-20 NOTE — PROGRESS NOTES
Refill: 0  - Amphetamine ER (ADZENYS XR-ODT) 15.7 MG TBED; Take 2 tablets by mouth daily for 30 days. Max Daily Amount: 2 tablets  Dispense: 60 each; Refill: 0    2. Depression with anxiety  Mood doing well  Cont supportive therapy    3. History of acute JRA  asymptomatic    4. Elevated blood pressure reading  Recheck normal  Has orders for bloodwork     5. Ischemic heart disease screen  Has order for CT calcium score  Management pending results. Follow-up appointment:   Pending bloodwork  Pending CT  1 month to check on efficacy of med change    Discussed use, benefit, and side effects of all prescribed medications. Barriers to medication compliance addressed. All patient questions answered. Pt voiced understanding. When applicable, patient's outside records were reviewed through [facility]. The patient has signed appropriate paperworks/consents.

## 2023-05-22 ENCOUNTER — OFFICE VISIT (OUTPATIENT)
Dept: FAMILY MEDICINE CLINIC | Age: 53
End: 2023-05-22
Payer: COMMERCIAL

## 2023-05-22 VITALS
WEIGHT: 196 LBS | DIASTOLIC BLOOD PRESSURE: 82 MMHG | TEMPERATURE: 98.5 F | OXYGEN SATURATION: 99 % | RESPIRATION RATE: 14 BRPM | BODY MASS INDEX: 29.8 KG/M2 | HEART RATE: 77 BPM | SYSTOLIC BLOOD PRESSURE: 122 MMHG

## 2023-05-22 DIAGNOSIS — F98.8 ATTENTION DEFICIT DISORDER, UNSPECIFIED HYPERACTIVITY PRESENCE: ICD-10-CM

## 2023-05-22 DIAGNOSIS — F41.8 DEPRESSION WITH ANXIETY: Primary | ICD-10-CM

## 2023-05-22 PROCEDURE — 99214 OFFICE O/P EST MOD 30 MIN: CPT | Performed by: FAMILY MEDICINE

## 2023-05-22 RX ORDER — METHYLPHENIDATE HYDROCHLORIDE 5 MG/1
5 TABLET ORAL 3 TIMES DAILY
Qty: 90 TABLET | Refills: 0 | Status: SHIPPED | OUTPATIENT
Start: 2023-06-21 | End: 2023-07-21

## 2023-05-22 RX ORDER — AMPHETAMINE 15.7 MG/1
2 TABLET, ORALLY DISINTEGRATING ORAL DAILY
Qty: 60 EACH | Refills: 0 | Status: SHIPPED | OUTPATIENT
Start: 2023-06-21 | End: 2023-07-21

## 2023-05-22 RX ORDER — AMPHETAMINE 15.7 MG/1
2 TABLET, ORALLY DISINTEGRATING ORAL DAILY
Qty: 60 EACH | Refills: 0 | Status: SHIPPED | OUTPATIENT
Start: 2023-05-22 | End: 2023-06-21

## 2023-05-22 RX ORDER — METHYLPHENIDATE HYDROCHLORIDE 5 MG/1
5 TABLET ORAL 3 TIMES DAILY
Qty: 90 TABLET | Refills: 0 | Status: SHIPPED | OUTPATIENT
Start: 2023-07-21 | End: 2023-08-20

## 2023-05-22 RX ORDER — AMPHETAMINE 15.7 MG/1
2 TABLET, ORALLY DISINTEGRATING ORAL DAILY
Qty: 60 EACH | Refills: 0 | Status: SHIPPED | OUTPATIENT
Start: 2023-07-21 | End: 2023-08-20

## 2023-05-22 RX ORDER — METHYLPHENIDATE HYDROCHLORIDE 5 MG/1
5 TABLET ORAL 3 TIMES DAILY
Qty: 90 TABLET | Refills: 0 | Status: SHIPPED | OUTPATIENT
Start: 2023-05-22 | End: 2023-06-21

## 2023-05-22 NOTE — PROGRESS NOTES
Here for f/u and recheck of ADD, and recheck of recent change in meds to [facility]. Pt staets that with initial dosing, did have some minimal upset stomach but that was very mild. Pt does know not to drink soda as it can upset stomach. Pt does feel that it has been very similar to his concerta and is happy to continue. Here for follow up of ADD. Pt states that they are doing very well with current therapy and feels that control of symptoms are adequate at this time. No breakthru symptoms and no need/indication for adjustment in therapy. Taking meds as prescribed and denies any illicit medication usage of misuse of their stimulant thearpy. Mood is stable and denies any issues of depression or anxiety associated with treatment of ADD. Except as noted above in the history of present illness, the review of systems is  negative for headache, vision changes, chest pain, shortness of breath, abdominal pain, urinary sx, bowel changes. Past medical, surgical, and social history reviewed and updated  Medications and allergies reviewed and updated      O: /82   Pulse 77   Temp 98.5 °F (36.9 °C) (Temporal)   Resp 14   Wt 196 lb (88.9 kg)   SpO2 99%   BMI 29.80 kg/m²   GEN: No acute distress, cooperative, well nourished, alert. HEENT: PEERLA, EOMI , normocephalic/atraumatic, nares and oropharynx clear. Mucous membranes normal, Tympanic membranes clear bilaterally. Neck: soft, supple, no thyromegaly, mass, no Lymphadenopathy  CV: Regular rate and rhythm, no murmur, rubs, gallops. No edema. Resp: Clear to auscultation bilaterally good air entry bilaterally  No crackles, wheeze. Breathing comfortably. Psych: mood stable, No suicidal thoughts or ideation           Current Outpatient Medications   Medication Sig Dispense Refill    [START ON 7/21/2023] methylphenidate (RITALIN) 5 MG tablet Take 1 tablet by mouth 3 times daily for 30 days.  90 tablet 0    [START ON 6/21/2023] methylphenidate

## 2023-07-16 RX ORDER — PANTOPRAZOLE SODIUM 40 MG/1
TABLET, DELAYED RELEASE ORAL
Qty: 30 TABLET | Refills: 5 | Status: SHIPPED | OUTPATIENT
Start: 2023-07-16

## 2023-07-16 RX ORDER — DICLOFENAC SODIUM 75 MG/1
TABLET, DELAYED RELEASE ORAL
Qty: 60 TABLET | Refills: 5 | Status: SHIPPED | OUTPATIENT
Start: 2023-07-16

## 2023-07-31 ENCOUNTER — OFFICE VISIT (OUTPATIENT)
Dept: FAMILY MEDICINE CLINIC | Age: 53
End: 2023-07-31
Payer: COMMERCIAL

## 2023-07-31 VITALS
TEMPERATURE: 97.8 F | HEART RATE: 72 BPM | WEIGHT: 196.2 LBS | BODY MASS INDEX: 29.83 KG/M2 | SYSTOLIC BLOOD PRESSURE: 122 MMHG | RESPIRATION RATE: 14 BRPM | OXYGEN SATURATION: 99 % | DIASTOLIC BLOOD PRESSURE: 82 MMHG

## 2023-07-31 DIAGNOSIS — F98.8 ATTENTION DEFICIT DISORDER, UNSPECIFIED HYPERACTIVITY PRESENCE: Primary | ICD-10-CM

## 2023-07-31 DIAGNOSIS — F41.8 DEPRESSION WITH ANXIETY: ICD-10-CM

## 2023-07-31 DIAGNOSIS — M54.50 CHRONIC BILATERAL LOW BACK PAIN WITHOUT SCIATICA: ICD-10-CM

## 2023-07-31 DIAGNOSIS — M25.551 RIGHT HIP PAIN: ICD-10-CM

## 2023-07-31 DIAGNOSIS — G89.29 CHRONIC BILATERAL LOW BACK PAIN WITHOUT SCIATICA: ICD-10-CM

## 2023-07-31 PROCEDURE — 99214 OFFICE O/P EST MOD 30 MIN: CPT | Performed by: FAMILY MEDICINE

## 2023-07-31 RX ORDER — METHYLPHENIDATE HYDROCHLORIDE 20 MG/1
20 TABLET ORAL 3 TIMES DAILY
Qty: 90 TABLET | Refills: 0 | Status: SHIPPED | OUTPATIENT
Start: 2023-07-31 | End: 2023-08-30

## 2023-07-31 RX ORDER — METHYLPHENIDATE HYDROCHLORIDE 5 MG/1
5 TABLET ORAL 3 TIMES DAILY
Qty: 90 TABLET | Refills: 0 | Status: CANCELLED | OUTPATIENT
Start: 2023-08-20 | End: 2023-09-19

## 2023-07-31 RX ORDER — METHYLPHENIDATE HYDROCHLORIDE 5 MG/1
5 TABLET ORAL 3 TIMES DAILY
Qty: 90 TABLET | Refills: 0 | Status: CANCELLED | OUTPATIENT
Start: 2023-10-19 | End: 2023-11-18

## 2023-07-31 RX ORDER — METHYLPHENIDATE HYDROCHLORIDE 5 MG/1
5 TABLET ORAL 3 TIMES DAILY
Qty: 90 TABLET | Refills: 0 | Status: CANCELLED | OUTPATIENT
Start: 2023-09-19 | End: 2023-10-19

## 2023-08-31 ENCOUNTER — OFFICE VISIT (OUTPATIENT)
Dept: FAMILY MEDICINE CLINIC | Age: 53
End: 2023-08-31
Payer: COMMERCIAL

## 2023-08-31 VITALS
SYSTOLIC BLOOD PRESSURE: 124 MMHG | HEART RATE: 63 BPM | RESPIRATION RATE: 14 BRPM | WEIGHT: 196 LBS | TEMPERATURE: 98.3 F | OXYGEN SATURATION: 99 % | DIASTOLIC BLOOD PRESSURE: 74 MMHG | BODY MASS INDEX: 29.8 KG/M2

## 2023-08-31 DIAGNOSIS — F98.8 ATTENTION DEFICIT DISORDER, UNSPECIFIED HYPERACTIVITY PRESENCE: ICD-10-CM

## 2023-08-31 DIAGNOSIS — F41.8 DEPRESSION WITH ANXIETY: Primary | ICD-10-CM

## 2023-08-31 DIAGNOSIS — Z13.6 ISCHEMIC HEART DISEASE SCREEN: ICD-10-CM

## 2023-08-31 PROCEDURE — 99214 OFFICE O/P EST MOD 30 MIN: CPT | Performed by: FAMILY MEDICINE

## 2023-08-31 RX ORDER — METHYLPHENIDATE HYDROCHLORIDE 20 MG/1
20 TABLET ORAL 3 TIMES DAILY
Qty: 90 TABLET | Refills: 0 | Status: SHIPPED | OUTPATIENT
Start: 2023-08-31 | End: 2023-09-30

## 2023-08-31 RX ORDER — METHYLPHENIDATE HYDROCHLORIDE 20 MG/1
20 TABLET ORAL 3 TIMES DAILY
Qty: 90 TABLET | Refills: 0 | Status: SHIPPED | OUTPATIENT
Start: 2023-09-30 | End: 2023-10-30

## 2023-08-31 RX ORDER — METHYLPHENIDATE HYDROCHLORIDE 20 MG/1
20 TABLET ORAL 3 TIMES DAILY
Qty: 90 TABLET | Refills: 0 | Status: SHIPPED | OUTPATIENT
Start: 2023-10-30 | End: 2023-11-29

## 2023-10-04 ENCOUNTER — PATIENT MESSAGE (OUTPATIENT)
Dept: FAMILY MEDICINE CLINIC | Age: 53
End: 2023-10-04

## 2023-10-04 DIAGNOSIS — F98.8 ATTENTION DEFICIT DISORDER, UNSPECIFIED HYPERACTIVITY PRESENCE: ICD-10-CM

## 2023-10-05 NOTE — TELEPHONE ENCOUNTER
From: Brennen Mcrae  To: Dr. Mace Dear: 10/4/2023 10:20 PM EDT  Subject: 5mg methylphenidate prescription    Hi Dr. Jayla Thao,  The pharmacy said they did not have a new 5mg methylphenidate. Would you please send a new subscription to [facility]?   Thanks,  Vadim Zimmerman

## 2023-10-06 RX ORDER — METHYLPHENIDATE HYDROCHLORIDE 5 MG/1
5 TABLET ORAL 3 TIMES DAILY
Qty: 90 TABLET | Refills: 0 | Status: SHIPPED | OUTPATIENT
Start: 2023-10-06 | End: 2023-11-05

## 2023-10-06 NOTE — TELEPHONE ENCOUNTER
Pt takes Ritalin 5 mg prn in the evening last refill was 7/23   Medication is pending to the pharmacy.

## 2023-11-03 DIAGNOSIS — F98.8 ATTENTION DEFICIT DISORDER, UNSPECIFIED HYPERACTIVITY PRESENCE: ICD-10-CM

## 2023-11-03 RX ORDER — METHYLPHENIDATE HYDROCHLORIDE 20 MG/1
20 TABLET ORAL 3 TIMES DAILY
Qty: 90 TABLET | Refills: 0 | Status: CANCELLED | OUTPATIENT
Start: 2023-11-03 | End: 2023-12-03

## 2023-11-04 NOTE — TELEPHONE ENCOUNTER
Requested Prescriptions     Pending Prescriptions Disp Refills    methylphenidate (RITALIN) 20 MG tablet 90 tablet 0     Sig: Take 1 tablet by mouth 3 times daily for 30 days.  Max Daily Amount: 60 mg          Last Office Visit: 8/31/2023     Next Office Visit: 11/30/2023     Last Labs: 4/12/21

## 2023-11-30 ENCOUNTER — OFFICE VISIT (OUTPATIENT)
Dept: FAMILY MEDICINE CLINIC | Age: 53
End: 2023-11-30
Payer: COMMERCIAL

## 2023-11-30 VITALS
HEART RATE: 79 BPM | BODY MASS INDEX: 30.71 KG/M2 | SYSTOLIC BLOOD PRESSURE: 122 MMHG | WEIGHT: 202 LBS | OXYGEN SATURATION: 98 % | TEMPERATURE: 98.1 F | DIASTOLIC BLOOD PRESSURE: 84 MMHG

## 2023-11-30 DIAGNOSIS — Z00.00 ROUTINE GENERAL MEDICAL EXAMINATION AT A HEALTH CARE FACILITY: ICD-10-CM

## 2023-11-30 DIAGNOSIS — F41.8 DEPRESSION WITH ANXIETY: ICD-10-CM

## 2023-11-30 DIAGNOSIS — F98.8 ATTENTION DEFICIT DISORDER, UNSPECIFIED HYPERACTIVITY PRESENCE: ICD-10-CM

## 2023-11-30 DIAGNOSIS — J45.20 MILD INTERMITTENT ASTHMA WITHOUT COMPLICATION: Primary | ICD-10-CM

## 2023-11-30 LAB
DEPRECATED RDW RBC AUTO: 13.4 % (ref 12.4–15.4)
HCT VFR BLD AUTO: 47 % (ref 40.5–52.5)
HGB BLD-MCNC: 16.1 G/DL (ref 13.5–17.5)
MCH RBC QN AUTO: 29.1 PG (ref 26–34)
MCHC RBC AUTO-ENTMCNC: 34.2 G/DL (ref 31–36)
MCV RBC AUTO: 85.1 FL (ref 80–100)
PLATELET # BLD AUTO: 255 K/UL (ref 135–450)
PMV BLD AUTO: 9.5 FL (ref 5–10.5)
RBC # BLD AUTO: 5.53 M/UL (ref 4.2–5.9)
WBC # BLD AUTO: 4.2 K/UL (ref 4–11)

## 2023-11-30 PROCEDURE — 99214 OFFICE O/P EST MOD 30 MIN: CPT | Performed by: FAMILY MEDICINE

## 2023-11-30 RX ORDER — METHYLPHENIDATE HYDROCHLORIDE 20 MG/1
20 TABLET ORAL 4 TIMES DAILY
Qty: 120 TABLET | Refills: 0 | Status: SHIPPED | OUTPATIENT
Start: 2023-11-30 | End: 2023-12-30

## 2023-11-30 RX ORDER — METHYLPHENIDATE HYDROCHLORIDE 20 MG/1
20 TABLET ORAL 4 TIMES DAILY
Qty: 120 TABLET | Refills: 0 | Status: SHIPPED | OUTPATIENT
Start: 2023-12-30 | End: 2024-01-29

## 2023-11-30 RX ORDER — METHYLPHENIDATE HYDROCHLORIDE 20 MG/1
20 TABLET ORAL 4 TIMES DAILY
Qty: 120 TABLET | Refills: 0 | Status: SHIPPED | OUTPATIENT
Start: 2024-01-29 | End: 2024-02-28

## 2023-11-30 NOTE — PROGRESS NOTES
stable, No suicidal thoughts or ideation         Current Outpatient Medications   Medication Sig Dispense Refill    diclofenac (VOLTAREN) 75 MG EC tablet TAKE 1 TABLET BY MOUTH TWICE DAILY 60 tablet 5    pantoprazole (PROTONIX) 40 MG tablet TAKE 1 TABLET BY MOUTH DAILY 30 tablet 5    methylphenidate (RITALIN) 20 MG tablet Take 1 tablet by mouth in the morning, at noon, in the evening, and at bedtime for 30 days. Max Daily Amount: 80 mg 120 tablet 0    [START ON 12/30/2023] methylphenidate (RITALIN) 20 MG tablet Take 1 tablet by mouth in the morning, at noon, in the evening, and at bedtime for 30 days. Max Daily Amount: 80 mg 120 tablet 0    [START ON 1/29/2024] methylphenidate (RITALIN) 20 MG tablet Take 1 tablet by mouth in the morning, at noon, in the evening, and at bedtime for 30 days. Max Daily Amount: 80 mg 120 tablet 0    methylphenidate (RITALIN) 5 MG tablet Take 1 tablet by mouth 3 times daily for 30 days. 90 tablet 0     No current facility-administered medications for this visit. ASSESSMENT / PLAN:    1. Mild intermittent asthma without complication  Stable w/ supportive therapy  No recent flare of sx    2. Depression with anxiety  Stable off meds  Feels doing great, controlled    3. Attention deficit disorder, unspecified hyperactivity presence  Stable, taking ritalin 20mg QID  refills given as below  See CSM  Pt aware of need for every 3 month medication followup appointments, and that medication refills for benzodiazepines, narcotics and/or stimulants will only be given at appointment. - methylphenidate (RITALIN) 20 MG tablet; Take 1 tablet by mouth in the morning, at noon, in the evening, and at bedtime for 30 days. Max Daily Amount: 80 mg  Dispense: 120 tablet; Refill: 0  - methylphenidate (RITALIN) 20 MG tablet; Take 1 tablet by mouth in the morning, at noon, in the evening, and at bedtime for 30 days. Max Daily Amount: 80 mg  Dispense: 120 tablet;  Refill: 0  - methylphenidate (RITALIN)

## 2023-12-01 LAB
ALBUMIN SERPL-MCNC: 4.9 G/DL (ref 3.4–5)
ALBUMIN/GLOB SERPL: 1.8 {RATIO} (ref 1.1–2.2)
ALP SERPL-CCNC: 77 U/L (ref 40–129)
ALT SERPL-CCNC: 36 U/L (ref 10–40)
ANION GAP SERPL CALCULATED.3IONS-SCNC: 9 MMOL/L (ref 3–16)
AST SERPL-CCNC: 20 U/L (ref 15–37)
BILIRUB SERPL-MCNC: 0.5 MG/DL (ref 0–1)
BUN SERPL-MCNC: 13 MG/DL (ref 7–20)
CALCIUM SERPL-MCNC: 9.7 MG/DL (ref 8.3–10.6)
CHLORIDE SERPL-SCNC: 101 MMOL/L (ref 99–110)
CHOLEST SERPL-MCNC: 193 MG/DL (ref 0–199)
CO2 SERPL-SCNC: 29 MMOL/L (ref 21–32)
CREAT SERPL-MCNC: 1 MG/DL (ref 0.9–1.3)
EST. AVERAGE GLUCOSE BLD GHB EST-MCNC: 99.7 MG/DL
GFR SERPLBLD CREATININE-BSD FMLA CKD-EPI: >60 ML/MIN/{1.73_M2}
GLUCOSE SERPL-MCNC: 102 MG/DL (ref 70–99)
HBA1C MFR BLD: 5.1 %
HDLC SERPL-MCNC: 50 MG/DL (ref 40–60)
LDLC SERPL CALC-MCNC: 131 MG/DL
POTASSIUM SERPL-SCNC: 4.7 MMOL/L (ref 3.5–5.1)
PROT SERPL-MCNC: 7.7 G/DL (ref 6.4–8.2)
PSA SERPL DL<=0.01 NG/ML-MCNC: 0.43 NG/ML (ref 0–4)
SODIUM SERPL-SCNC: 139 MMOL/L (ref 136–145)
TRIGL SERPL-MCNC: 62 MG/DL (ref 0–150)
TSH SERPL DL<=0.005 MIU/L-ACNC: 1.72 UIU/ML (ref 0.27–4.2)
VLDLC SERPL CALC-MCNC: 12 MG/DL

## 2024-01-15 ENCOUNTER — PATIENT MESSAGE (OUTPATIENT)
Dept: FAMILY MEDICINE CLINIC | Age: 54
End: 2024-01-15

## 2024-01-15 DIAGNOSIS — F98.8 ATTENTION DEFICIT DISORDER, UNSPECIFIED HYPERACTIVITY PRESENCE: Primary | ICD-10-CM

## 2024-01-15 RX ORDER — PANTOPRAZOLE SODIUM 40 MG/1
TABLET, DELAYED RELEASE ORAL
Qty: 30 TABLET | Refills: 5 | Status: SHIPPED | OUTPATIENT
Start: 2024-01-15

## 2024-01-15 RX ORDER — DICLOFENAC SODIUM 75 MG/1
TABLET, DELAYED RELEASE ORAL
Qty: 60 TABLET | Refills: 5 | Status: SHIPPED | OUTPATIENT
Start: 2024-01-15

## 2024-01-15 NOTE — TELEPHONE ENCOUNTER
From: Ronal Lynn  To: Dr. Say Gallardo  Sent: 1/15/2024 2:26 PM EST  Subject: 40mg and 20 mg methylphenidate availability    Hi Dr. Gallardo,  I hope you had a great holiday.  The pharmacy is now saying that they have no 40mg, or 20mg methylphenidate in stock. However, they do have 40mg methylphenidate LA available. Is it possible to have my prescription changed to this. It has been miserable trying to get my medication ever since they discontinued the generic CONCERTA. Any help or suggestions would be awesome.  Thank you and have a great day!  Pranav

## 2024-01-15 NOTE — TELEPHONE ENCOUNTER
Requested Prescriptions     Pending Prescriptions Disp Refills    pantoprazole (PROTONIX) 40 MG tablet [Pharmacy Med Name: PANTOPRAZOLE 40MG TABLETS] 30 tablet 5     Sig: TAKE 1 TABLET BY MOUTH DAILY    diclofenac (VOLTAREN) 75 MG EC tablet [Pharmacy Med Name: DICLOFENAC SODIUM 75MG DR TABLETS] 60 tablet 5     Sig: TAKE 1 TABLET BY MOUTH TWICE DAILY          Last Office Visit: 11/30/2023     Next Office Visit: 2/29/2024

## 2024-01-17 RX ORDER — METHYLPHENIDATE HYDROCHLORIDE 40 MG/1
40 CAPSULE, EXTENDED RELEASE ORAL DAILY
Qty: 30 CAPSULE | Refills: 0 | Status: SHIPPED | OUTPATIENT
Start: 2024-01-17 | End: 2024-02-16

## 2024-02-15 ENCOUNTER — PATIENT MESSAGE (OUTPATIENT)
Dept: FAMILY MEDICINE CLINIC | Age: 54
End: 2024-02-15

## 2024-02-15 DIAGNOSIS — F98.8 ATTENTION DEFICIT DISORDER, UNSPECIFIED HYPERACTIVITY PRESENCE: ICD-10-CM

## 2024-02-15 NOTE — TELEPHONE ENCOUNTER
From: Ronal Lynn  To: Dr. Say Gallardo  Sent: 2/15/2024 11:47 AM EST  Subject: Methylphenidate 40MG LA (ER) Caps    Hi Dr. Gallardo,  The prescription for the Methylphenidate 40MG LA (ER) Caps will be out tomorrow, 2/16. Would you please send another prescription to the [facility]. I’ve already called them and confirmed that they have it available. I have my next appointment at the end of this month, and I’d like to talk about a plan going forward.  Thank you.  Ronal

## 2024-02-15 NOTE — TELEPHONE ENCOUNTER
Requested Prescriptions     Pending Prescriptions Disp Refills    methylphenidate (RITALIN LA) 40 MG extended release capsule 30 capsule 0     Sig: Take 1 capsule by mouth daily for 30 days. Max Daily Amount: 40 mg          Last Office Visit: 11/30/2023     Next Office Visit: 2/29/2024     Last Labs:  11/30/23

## 2024-02-16 RX ORDER — METHYLPHENIDATE HYDROCHLORIDE 40 MG/1
40 CAPSULE, EXTENDED RELEASE ORAL DAILY
Qty: 30 CAPSULE | Refills: 0 | Status: SHIPPED | OUTPATIENT
Start: 2024-02-16 | End: 2024-03-17

## 2024-02-29 ENCOUNTER — OFFICE VISIT (OUTPATIENT)
Dept: FAMILY MEDICINE CLINIC | Age: 54
End: 2024-02-29
Payer: COMMERCIAL

## 2024-02-29 VITALS
RESPIRATION RATE: 16 BRPM | BODY MASS INDEX: 31.92 KG/M2 | TEMPERATURE: 97.3 F | HEIGHT: 68 IN | OXYGEN SATURATION: 99 % | DIASTOLIC BLOOD PRESSURE: 82 MMHG | SYSTOLIC BLOOD PRESSURE: 132 MMHG | WEIGHT: 210.6 LBS | HEART RATE: 62 BPM

## 2024-02-29 DIAGNOSIS — F98.8 ATTENTION DEFICIT DISORDER, UNSPECIFIED HYPERACTIVITY PRESENCE: ICD-10-CM

## 2024-02-29 DIAGNOSIS — F41.8 DEPRESSION WITH ANXIETY: Primary | ICD-10-CM

## 2024-02-29 PROCEDURE — 99214 OFFICE O/P EST MOD 30 MIN: CPT | Performed by: FAMILY MEDICINE

## 2024-02-29 ASSESSMENT — PATIENT HEALTH QUESTIONNAIRE - PHQ9
SUM OF ALL RESPONSES TO PHQ QUESTIONS 1-9: 0
2. FEELING DOWN, DEPRESSED OR HOPELESS: 0
SUM OF ALL RESPONSES TO PHQ QUESTIONS 1-9: 0
10. IF YOU CHECKED OFF ANY PROBLEMS, HOW DIFFICULT HAVE THESE PROBLEMS MADE IT FOR YOU TO DO YOUR WORK, TAKE CARE OF THINGS AT HOME, OR GET ALONG WITH OTHER PEOPLE: 0
SUM OF ALL RESPONSES TO PHQ QUESTIONS 1-9: 0
7. TROUBLE CONCENTRATING ON THINGS, SUCH AS READING THE NEWSPAPER OR WATCHING TELEVISION: 0
6. FEELING BAD ABOUT YOURSELF - OR THAT YOU ARE A FAILURE OR HAVE LET YOURSELF OR YOUR FAMILY DOWN: 0
3. TROUBLE FALLING OR STAYING ASLEEP: 0
4. FEELING TIRED OR HAVING LITTLE ENERGY: 0
9. THOUGHTS THAT YOU WOULD BE BETTER OFF DEAD, OR OF HURTING YOURSELF: 0
SUM OF ALL RESPONSES TO PHQ QUESTIONS 1-9: 0
SUM OF ALL RESPONSES TO PHQ9 QUESTIONS 1 & 2: 0
1. LITTLE INTEREST OR PLEASURE IN DOING THINGS: 0
8. MOVING OR SPEAKING SO SLOWLY THAT OTHER PEOPLE COULD HAVE NOTICED. OR THE OPPOSITE, BEING SO FIGETY OR RESTLESS THAT YOU HAVE BEEN MOVING AROUND A LOT MORE THAN USUAL: 0
5. POOR APPETITE OR OVEREATING: 0

## 2024-02-29 NOTE — PROGRESS NOTES
Here for f/u and recheck of ADD, mood    Here for follow up of ADD.  Pt states that they are doing very well with current therapy and feels that control of symptoms are adequate at this time.  No breakthru symptoms and no need/indication for adjustment in therapy.  Taking meds as prescribed and denies any illicit medication usage of misuse of their stimulant thearpy.  Mood is stable and denies any issues of depression or anxiety associated with treatment of ADD.  Pt is having issues with finding his prescriptions, and is currently on the 40mg ER of ritalin and does seem to help but does feel wearing off after 5-6 hours.  Pt normally takes about 5:30-6AM as that is when he goes to work.   Pt did try the adzenys but did not feel it worked as effectively, and had some side effects issues, possible irritable.       Mood doing ok overall, stable.  Work is doing well, has been busy.  Pt is back full time, rarely works from home    Except as noted above in the history of present illness, the review of systems is  negative for headache, vision changes, chest pain, shortness of breath, abdominal pain, urinary sx, bowel changes.    Past medical, surgical, and social history reviewed and updated  Medications and allergies reviewed and updated      O: /82   Pulse 62   Temp 97.3 °F (36.3 °C) (Temporal)   Resp 16   Ht 1.727 m (5' 8\")   Wt 95.5 kg (210 lb 9.6 oz)   SpO2 99%   BMI 32.02 kg/m²   GEN: No acute distress, cooperative, well nourished, alert.   HEENT: PEERLA, EOMI , normocephalic/atraumatic, nares and oropharynx clear.  Mucous membranes normal, Tympanic membranes clear bilaterally.  Neck: soft, supple, no thyromegaly, mass, no Lymphadenopathy  CV: Regular rate and rhythm, no murmur, rubs, gallops. No edema.  Resp: Clear to auscultation bilaterally good air entry bilaterally  No crackles, wheeze. Breathing comfortably.   Psych: mood stable, No suicidal thoughts or ideation       Current Outpatient Medications

## 2024-03-14 ENCOUNTER — PATIENT MESSAGE (OUTPATIENT)
Dept: FAMILY MEDICINE CLINIC | Age: 54
End: 2024-03-14

## 2024-03-14 DIAGNOSIS — F98.8 ATTENTION DEFICIT DISORDER, UNSPECIFIED HYPERACTIVITY PRESENCE: Primary | ICD-10-CM

## 2024-03-15 NOTE — TELEPHONE ENCOUNTER
From: Ronal Lynn  To: Dr. Say Gallardo  Sent: 3/14/2024 7:58 PM EDT  Subject: 54 mg Methylphenidate ER (Generic for Aptensio XR)    Hi Dr. Gallardo,  I called the [facility] pharmacy and they said they have this medication available. I believe it’s very similar to what I was taking before the Conerta shortage caused me to change things. Would you please take a look at it, and if you think it’s comparable please send a prescription for it for me to the [facility]?  Thank you and have a nice evening.  Ronal

## 2024-03-18 RX ORDER — METHYLPHENIDATE HYDROCHLORIDE 54 MG/1
54 TABLET ORAL DAILY
Qty: 30 TABLET | Refills: 0 | Status: SHIPPED | OUTPATIENT
Start: 2024-03-18 | End: 2024-04-17

## 2024-03-18 NOTE — TELEPHONE ENCOUNTER
The last prescription was Ritalin LA at [facility] on 2/16/24        Hi Dr. Gallardo,     I sent a message on Thursday about having a prescription sent to the [facility].  I had enough medicine for three days, but it ran out yesterday, and I didn’t have any for today.  I felt OK early this morning, but have been having a hard time staying focused since about 7:30.  If you’re able please send it today, and if you don’t think it will be a good option please let me know so we can make other arrangements as soon as possible.     Thanks,  Pranav

## 2024-04-14 DIAGNOSIS — F98.8 ATTENTION DEFICIT DISORDER, UNSPECIFIED HYPERACTIVITY PRESENCE: ICD-10-CM

## 2024-04-15 RX ORDER — METHYLPHENIDATE HYDROCHLORIDE 54 MG/1
54 TABLET ORAL DAILY
Qty: 30 TABLET | Refills: 0 | Status: SHIPPED | OUTPATIENT
Start: 2024-04-15 | End: 2024-05-15

## 2024-04-23 ENCOUNTER — PATIENT MESSAGE (OUTPATIENT)
Dept: FAMILY MEDICINE CLINIC | Age: 54
End: 2024-04-23

## 2024-04-23 DIAGNOSIS — F98.8 ATTENTION DEFICIT DISORDER, UNSPECIFIED HYPERACTIVITY PRESENCE: ICD-10-CM

## 2024-04-23 RX ORDER — METHYLPHENIDATE HYDROCHLORIDE 54 MG/1
54 TABLET ORAL DAILY
Qty: 30 TABLET | Refills: 0 | Status: CANCELLED | OUTPATIENT
Start: 2024-04-23 | End: 2024-05-23

## 2024-04-23 NOTE — TELEPHONE ENCOUNTER
From: Ronal Lynn  To: Dr. Say Gallardo  Sent: 2024 4:14 PM EDT  Subject: Methylphenidate 5 mg immediate release    Hi Dr. Gallardo,  Would you please enter a methylphenidate 5mg prescription for me? They finally have it available and the script I had on file at [facility] . If you’re able to do this I would like it sent to the [facility] as usual.  Thank you and have a great day!  Pranav

## 2024-05-13 DIAGNOSIS — F98.8 ATTENTION DEFICIT DISORDER, UNSPECIFIED HYPERACTIVITY PRESENCE: ICD-10-CM

## 2024-05-14 RX ORDER — METHYLPHENIDATE HYDROCHLORIDE 54 MG/1
54 TABLET ORAL DAILY
Qty: 30 TABLET | Refills: 0 | Status: SHIPPED | OUTPATIENT
Start: 2024-05-14 | End: 2024-06-13

## 2024-05-24 ENCOUNTER — OFFICE VISIT (OUTPATIENT)
Dept: FAMILY MEDICINE CLINIC | Age: 54
End: 2024-05-24
Payer: COMMERCIAL

## 2024-05-24 VITALS
RESPIRATION RATE: 14 BRPM | HEART RATE: 71 BPM | WEIGHT: 209.8 LBS | OXYGEN SATURATION: 99 % | TEMPERATURE: 97.9 F | SYSTOLIC BLOOD PRESSURE: 126 MMHG | DIASTOLIC BLOOD PRESSURE: 76 MMHG | BODY MASS INDEX: 31.9 KG/M2

## 2024-05-24 DIAGNOSIS — J45.20 MILD INTERMITTENT ASTHMA WITHOUT COMPLICATION: ICD-10-CM

## 2024-05-24 DIAGNOSIS — F98.8 ATTENTION DEFICIT DISORDER, UNSPECIFIED HYPERACTIVITY PRESENCE: Primary | ICD-10-CM

## 2024-05-24 DIAGNOSIS — F41.8 DEPRESSION WITH ANXIETY: ICD-10-CM

## 2024-05-24 PROCEDURE — 99214 OFFICE O/P EST MOD 30 MIN: CPT | Performed by: FAMILY MEDICINE

## 2024-05-24 PROCEDURE — G2211 COMPLEX E/M VISIT ADD ON: HCPCS | Performed by: FAMILY MEDICINE

## 2024-05-24 RX ORDER — METHYLPHENIDATE HYDROCHLORIDE 54 MG/1
54 TABLET ORAL EVERY MORNING
Qty: 30 TABLET | Refills: 0 | Status: SHIPPED | OUTPATIENT
Start: 2024-07-13 | End: 2024-08-12

## 2024-05-24 RX ORDER — METHYLPHENIDATE HYDROCHLORIDE 5 MG/1
5 TABLET ORAL 3 TIMES DAILY
Qty: 90 TABLET | Refills: 0 | Status: SHIPPED | OUTPATIENT
Start: 2024-07-23 | End: 2024-08-22

## 2024-05-24 RX ORDER — METHYLPHENIDATE HYDROCHLORIDE 5 MG/1
5 TABLET ORAL 3 TIMES DAILY
Qty: 90 TABLET | Refills: 0 | Status: SHIPPED | OUTPATIENT
Start: 2024-06-23 | End: 2024-07-23

## 2024-05-24 RX ORDER — METHYLPHENIDATE HYDROCHLORIDE 54 MG/1
54 TABLET ORAL DAILY
Qty: 30 TABLET | Refills: 0 | Status: SHIPPED | OUTPATIENT
Start: 2024-06-13 | End: 2024-07-13

## 2024-05-24 RX ORDER — METHYLPHENIDATE HYDROCHLORIDE 5 MG/1
5 TABLET ORAL 3 TIMES DAILY
Qty: 90 TABLET | Refills: 0 | Status: SHIPPED | OUTPATIENT
Start: 2024-05-24 | End: 2024-06-23

## 2024-05-24 SDOH — ECONOMIC STABILITY: FOOD INSECURITY: WITHIN THE PAST 12 MONTHS, YOU WORRIED THAT YOUR FOOD WOULD RUN OUT BEFORE YOU GOT MONEY TO BUY MORE.: NEVER TRUE

## 2024-05-24 SDOH — ECONOMIC STABILITY: FOOD INSECURITY: WITHIN THE PAST 12 MONTHS, THE FOOD YOU BOUGHT JUST DIDN'T LAST AND YOU DIDN'T HAVE MONEY TO GET MORE.: NEVER TRUE

## 2024-05-24 SDOH — ECONOMIC STABILITY: INCOME INSECURITY: HOW HARD IS IT FOR YOU TO PAY FOR THE VERY BASICS LIKE FOOD, HOUSING, MEDICAL CARE, AND HEATING?: NOT HARD AT ALL

## 2024-05-24 NOTE — PROGRESS NOTES
Here for f/u and recheck of ADD, mood    Here for follow up of ADD.  Pt states that they are doing very well with current therapy and feels that control of symptoms are adequate at this time.  No breakthru symptoms and no need/indication for adjustment in therapy.  Taking meds as prescribed and denies any illicit medication usage of misuse of their stimulant thearpy.  Mood is stable and denies any issues of depression or anxiety associated with treatment of ADD.  Pt states that prior to shortage issues, was on concerta 54 and 15mg of short acting a day (three 5mg tabs).  Pt takes concerta usually between 5:30-6AM and that does well but wears off early afternoon, and would take the short-acting between 5:30-6PM, and that would not affect his sleep     Breathing is doing well, staying healthy w/o any URI sx.  Pt feels that breathing is doing ok.  No issues of chest pain, shortness of breath.  No URI sx.    Mood doing well, stable.  Pt is feeling well, work is stable.  Pts daughter just graduated from [employer] and is going to [employer] for PA school        Except as noted above in the history of present illness, the review of systems is  negative for headache, vision changes, chest pain, shortness of breath, abdominal pain, urinary sx, bowel changes.    Past medical, surgical, and social history reviewed and updated  Medications and allergies reviewed and updated        O: /76   Pulse 71   Temp 97.9 °F (36.6 °C) (Temporal)   Resp 14   Wt 95.2 kg (209 lb 12.8 oz)   SpO2 99%   BMI 31.90 kg/m²   GEN: No acute distress, cooperative, well nourished, alert.   HEENT: PEERLA, EOMI , normocephalic/atraumatic, nares and oropharynx clear.  Mucous membranes normal, Tympanic membranes clear bilaterally.  Neck: soft, supple, no thyromegaly, mass, no Lymphadenopathy  CV: Regular rate and rhythm, no murmur, rubs, gallops. No edema.  Resp: Clear to auscultation bilaterally good air entry bilaterally  No crackles, wheeze.

## 2024-07-30 RX ORDER — PANTOPRAZOLE SODIUM 40 MG/1
40 TABLET, DELAYED RELEASE ORAL DAILY
Qty: 30 TABLET | Refills: 5 | Status: SHIPPED | OUTPATIENT
Start: 2024-07-30

## 2024-08-14 DIAGNOSIS — F98.8 ATTENTION DEFICIT DISORDER, UNSPECIFIED HYPERACTIVITY PRESENCE: ICD-10-CM

## 2024-08-14 RX ORDER — METHYLPHENIDATE HYDROCHLORIDE 54 MG/1
54 TABLET ORAL DAILY
Qty: 30 TABLET | Refills: 0 | Status: SHIPPED | OUTPATIENT
Start: 2024-08-14 | End: 2024-09-13

## 2024-08-23 ENCOUNTER — OFFICE VISIT (OUTPATIENT)
Dept: FAMILY MEDICINE CLINIC | Age: 54
End: 2024-08-23
Payer: COMMERCIAL

## 2024-08-23 VITALS
RESPIRATION RATE: 16 BRPM | TEMPERATURE: 97.5 F | DIASTOLIC BLOOD PRESSURE: 80 MMHG | HEART RATE: 68 BPM | BODY MASS INDEX: 31.63 KG/M2 | OXYGEN SATURATION: 99 % | WEIGHT: 208 LBS | SYSTOLIC BLOOD PRESSURE: 122 MMHG

## 2024-08-23 DIAGNOSIS — Z13.6 ISCHEMIC HEART DISEASE SCREEN: ICD-10-CM

## 2024-08-23 DIAGNOSIS — R03.0 ELEVATED BLOOD PRESSURE READING: ICD-10-CM

## 2024-08-23 DIAGNOSIS — F98.8 ATTENTION DEFICIT DISORDER, UNSPECIFIED HYPERACTIVITY PRESENCE: ICD-10-CM

## 2024-08-23 DIAGNOSIS — F41.8 DEPRESSION WITH ANXIETY: Primary | ICD-10-CM

## 2024-08-23 DIAGNOSIS — F43.9 STRESS: ICD-10-CM

## 2024-08-23 DIAGNOSIS — Z79.899 HIGH RISK MEDICATION USE: ICD-10-CM

## 2024-08-23 PROCEDURE — 99214 OFFICE O/P EST MOD 30 MIN: CPT | Performed by: FAMILY MEDICINE

## 2024-08-23 PROCEDURE — G2211 COMPLEX E/M VISIT ADD ON: HCPCS | Performed by: FAMILY MEDICINE

## 2024-08-23 RX ORDER — METHYLPHENIDATE HYDROCHLORIDE 54 MG/1
54 TABLET ORAL DAILY
Qty: 30 TABLET | Refills: 0 | Status: SHIPPED | OUTPATIENT
Start: 2024-09-13 | End: 2024-10-13

## 2024-08-23 RX ORDER — METHYLPHENIDATE HYDROCHLORIDE 5 MG/1
5 TABLET ORAL 3 TIMES DAILY
Qty: 90 TABLET | Refills: 0 | Status: SHIPPED | OUTPATIENT
Start: 2024-08-23 | End: 2024-09-22

## 2024-08-23 RX ORDER — METHYLPHENIDATE HYDROCHLORIDE 5 MG/1
5 TABLET ORAL 3 TIMES DAILY
Qty: 90 TABLET | Refills: 0 | Status: SHIPPED | OUTPATIENT
Start: 2024-09-22 | End: 2024-10-22

## 2024-08-23 RX ORDER — METHYLPHENIDATE HYDROCHLORIDE 54 MG/1
54 TABLET ORAL EVERY MORNING
Qty: 30 TABLET | Refills: 0 | Status: SHIPPED | OUTPATIENT
Start: 2024-10-13 | End: 2024-11-12

## 2024-08-23 RX ORDER — METHYLPHENIDATE HYDROCHLORIDE 5 MG/1
5 TABLET ORAL 3 TIMES DAILY
Qty: 90 TABLET | Refills: 0 | Status: SHIPPED | OUTPATIENT
Start: 2024-10-22 | End: 2024-11-21

## 2024-08-23 NOTE — PROGRESS NOTES
Here for f/u and recheck of ADD, mood, blood pressure    Pt overall doing well, daughter did just graduated from [employer] and will be doing PA school at [employer].  That is a 3 year program.  Pt states that stress levels have been a little bit higher, some financial stressors    Pt here for follow up of blood pressure.  Pt states doing great with adherence to therapy and feels well.  No issues of chest pain, shortness of breath.  No vision changes, headache, swelling in legs.     Here for follow up of ADD.  Pt states that they are doing very well with current therapy and feels that control of symptoms are adequate at this time.  No breakthru symptoms and no need/indication for adjustment in therapy.  Taking meds as prescribed and denies any illicit medication usage of misuse of their stimulant thearpy.  Mood is stable and denies any issues of depression or anxiety associated with treatment of ADD.   Pt happy with combination of concerta 54mg and 15mg of short acting.  Pt feels it is doing well for him.        Except as noted above in the history of present illness, the review of systems is  negative for headache, vision changes, chest pain, shortness of breath, abdominal pain, urinary sx, bowel changes.    Past medical, surgical, and social history reviewed and updated  Medications and allergies reviewed and updated        O: /80   Pulse 68   Temp 97.5 °F (36.4 °C) (Temporal)   Resp 16   Wt 94.3 kg (208 lb)   SpO2 99%   BMI 31.63 kg/m²   GEN: No acute distress, cooperative, well nourished, alert.   HEENT: PEERLA, EOMI , normocephalic/atraumatic, nares and oropharynx clear.  Mucous membranes normal, Tympanic membranes clear bilaterally.  Neck: soft, supple, no thyromegaly, mass, no Lymphadenopathy  CV: Regular rate and rhythm, no murmur, rubs, gallops. No edema.  Resp: Clear to auscultation bilaterally good air entry bilaterally  No crackles, wheeze. Breathing comfortably.   Psych: mood stable,

## 2024-09-15 DIAGNOSIS — F98.8 ATTENTION DEFICIT DISORDER, UNSPECIFIED HYPERACTIVITY PRESENCE: ICD-10-CM

## 2024-09-16 RX ORDER — METHYLPHENIDATE HYDROCHLORIDE 54 MG/1
54 TABLET ORAL DAILY
Qty: 30 TABLET | Refills: 0 | OUTPATIENT
Start: 2024-09-16 | End: 2024-10-16

## 2024-11-16 DIAGNOSIS — F90.0 ATTENTION DEFICIT HYPERACTIVITY DISORDER (ADHD), PREDOMINANTLY INATTENTIVE TYPE: Primary | ICD-10-CM

## 2024-11-16 RX ORDER — METHYLPHENIDATE HYDROCHLORIDE 54 MG/1
54 TABLET ORAL DAILY
Qty: 30 TABLET | Refills: 0 | Status: CANCELLED | OUTPATIENT
Start: 2024-11-16 | End: 2024-12-16

## 2024-11-19 DIAGNOSIS — F90.0 ATTENTION DEFICIT HYPERACTIVITY DISORDER (ADHD), PREDOMINANTLY INATTENTIVE TYPE: ICD-10-CM

## 2024-11-19 RX ORDER — METHYLPHENIDATE HYDROCHLORIDE 54 MG/1
54 TABLET ORAL DAILY
Qty: 30 TABLET | Refills: 0 | Status: SHIPPED | OUTPATIENT
Start: 2024-11-19 | End: 2024-11-22 | Stop reason: SDUPTHER

## 2024-11-22 ENCOUNTER — OFFICE VISIT (OUTPATIENT)
Dept: FAMILY MEDICINE CLINIC | Age: 54
End: 2024-11-22

## 2024-11-22 VITALS
BODY MASS INDEX: 32.34 KG/M2 | SYSTOLIC BLOOD PRESSURE: 122 MMHG | DIASTOLIC BLOOD PRESSURE: 80 MMHG | TEMPERATURE: 96.8 F | WEIGHT: 213.4 LBS | HEIGHT: 68 IN | HEART RATE: 89 BPM | OXYGEN SATURATION: 97 %

## 2024-11-22 DIAGNOSIS — F41.8 DEPRESSION WITH ANXIETY: Primary | ICD-10-CM

## 2024-11-22 DIAGNOSIS — F90.0 ATTENTION DEFICIT HYPERACTIVITY DISORDER (ADHD), PREDOMINANTLY INATTENTIVE TYPE: ICD-10-CM

## 2024-11-22 DIAGNOSIS — J06.9 ACUTE URI: ICD-10-CM

## 2024-11-22 DIAGNOSIS — F98.8 ATTENTION DEFICIT DISORDER, UNSPECIFIED TYPE: ICD-10-CM

## 2024-11-22 RX ORDER — METHYLPHENIDATE HYDROCHLORIDE 5 MG/1
5 TABLET ORAL 3 TIMES DAILY
Qty: 90 TABLET | Refills: 0 | Status: SHIPPED | OUTPATIENT
Start: 2024-12-20 | End: 2025-01-19

## 2024-11-22 RX ORDER — METHYLPHENIDATE HYDROCHLORIDE 5 MG/1
5 TABLET ORAL 3 TIMES DAILY
Qty: 90 TABLET | Refills: 0 | Status: SHIPPED | OUTPATIENT
Start: 2025-01-19 | End: 2025-02-18

## 2024-11-22 RX ORDER — METHYLPHENIDATE HYDROCHLORIDE 54 MG/1
54 TABLET ORAL EVERY MORNING
Qty: 30 TABLET | Refills: 0 | Status: SHIPPED | OUTPATIENT
Start: 2025-01-19 | End: 2025-02-18

## 2024-11-22 RX ORDER — METHYLPHENIDATE HYDROCHLORIDE 54 MG/1
54 TABLET ORAL DAILY
Qty: 30 TABLET | Refills: 0 | Status: SHIPPED | OUTPATIENT
Start: 2024-12-20 | End: 2025-01-19

## 2025-01-24 RX ORDER — PANTOPRAZOLE SODIUM 40 MG/1
40 TABLET, DELAYED RELEASE ORAL DAILY
Qty: 30 TABLET | Refills: 5 | Status: SHIPPED | OUTPATIENT
Start: 2025-01-24

## 2025-01-24 NOTE — TELEPHONE ENCOUNTER
Requested Prescriptions             Pending Prescriptions Disp Refills    pantoprazole (PROTONIX) 40 MG tablet 30 tablet 5       Sig: Take 1 tablet by mouth daily            Last OV: 11/22/2024      Last labs: 11/30/2023     F/u: 2/14/2025

## 2025-01-24 NOTE — TELEPHONE ENCOUNTER
Requested Prescriptions     Pending Prescriptions Disp Refills    pantoprazole (PROTONIX) 40 MG tablet 30 tablet 5     Sig: Take 1 tablet by mouth daily          Last OV: 11/22/2024     Last labs: 11/30/2023     F/u: 2/14/2025     Dupixent Pregnancy And Lactation Text: This medication likely crosses the placenta but the risk for the fetus is uncertain. This medication is excreted in breast milk.

## 2025-02-14 ENCOUNTER — OFFICE VISIT (OUTPATIENT)
Dept: FAMILY MEDICINE CLINIC | Age: 55
End: 2025-02-14

## 2025-02-14 VITALS
HEIGHT: 68 IN | BODY MASS INDEX: 32.58 KG/M2 | OXYGEN SATURATION: 99 % | HEART RATE: 92 BPM | WEIGHT: 215 LBS | DIASTOLIC BLOOD PRESSURE: 76 MMHG | SYSTOLIC BLOOD PRESSURE: 122 MMHG

## 2025-02-14 DIAGNOSIS — F98.8 ATTENTION DEFICIT DISORDER, UNSPECIFIED TYPE: ICD-10-CM

## 2025-02-14 DIAGNOSIS — R03.0 ELEVATED BLOOD PRESSURE READING: ICD-10-CM

## 2025-02-14 DIAGNOSIS — F41.8 DEPRESSION WITH ANXIETY: ICD-10-CM

## 2025-02-14 DIAGNOSIS — F90.0 ATTENTION DEFICIT HYPERACTIVITY DISORDER (ADHD), PREDOMINANTLY INATTENTIVE TYPE: Primary | ICD-10-CM

## 2025-02-14 DIAGNOSIS — F43.9 STRESS: ICD-10-CM

## 2025-02-14 RX ORDER — METHYLPHENIDATE HYDROCHLORIDE 5 MG/1
5 TABLET ORAL 3 TIMES DAILY
Qty: 90 TABLET | Refills: 0 | Status: SHIPPED | OUTPATIENT
Start: 2025-03-16 | End: 2025-04-15

## 2025-02-14 RX ORDER — METHYLPHENIDATE HYDROCHLORIDE 5 MG/1
5 TABLET ORAL 3 TIMES DAILY
Qty: 90 TABLET | Refills: 0 | Status: SHIPPED | OUTPATIENT
Start: 2025-02-14 | End: 2025-03-16

## 2025-02-14 RX ORDER — METHYLPHENIDATE HYDROCHLORIDE 5 MG/1
5 TABLET ORAL 3 TIMES DAILY
Qty: 90 TABLET | Refills: 0 | Status: SHIPPED | OUTPATIENT
Start: 2025-04-15 | End: 2025-05-15

## 2025-02-14 RX ORDER — METHYLPHENIDATE HYDROCHLORIDE 54 MG/1
54 TABLET ORAL DAILY
Qty: 30 TABLET | Refills: 0 | Status: SHIPPED | OUTPATIENT
Start: 2025-03-16 | End: 2025-04-15

## 2025-02-14 RX ORDER — METHYLPHENIDATE HYDROCHLORIDE 54 MG/1
54 TABLET ORAL EVERY MORNING
Qty: 30 TABLET | Refills: 0 | Status: SHIPPED | OUTPATIENT
Start: 2025-02-14 | End: 2025-03-16

## 2025-02-14 RX ORDER — METHYLPHENIDATE HYDROCHLORIDE 54 MG/1
54 TABLET ORAL EVERY MORNING
Qty: 30 TABLET | Refills: 0 | Status: SHIPPED | OUTPATIENT
Start: 2025-04-15 | End: 2025-05-15

## 2025-02-14 SDOH — ECONOMIC STABILITY: FOOD INSECURITY: WITHIN THE PAST 12 MONTHS, YOU WORRIED THAT YOUR FOOD WOULD RUN OUT BEFORE YOU GOT MONEY TO BUY MORE.: NEVER TRUE

## 2025-02-14 SDOH — ECONOMIC STABILITY: FOOD INSECURITY: WITHIN THE PAST 12 MONTHS, THE FOOD YOU BOUGHT JUST DIDN'T LAST AND YOU DIDN'T HAVE MONEY TO GET MORE.: NEVER TRUE

## 2025-02-14 ASSESSMENT — PATIENT HEALTH QUESTIONNAIRE - PHQ9
9. THOUGHTS THAT YOU WOULD BE BETTER OFF DEAD, OR OF HURTING YOURSELF: NOT AT ALL
5. POOR APPETITE OR OVEREATING: NOT AT ALL
SUM OF ALL RESPONSES TO PHQ QUESTIONS 1-9: 0
SUM OF ALL RESPONSES TO PHQ9 QUESTIONS 1 & 2: 0
2. FEELING DOWN, DEPRESSED OR HOPELESS: NOT AT ALL
8. MOVING OR SPEAKING SO SLOWLY THAT OTHER PEOPLE COULD HAVE NOTICED. OR THE OPPOSITE, BEING SO FIGETY OR RESTLESS THAT YOU HAVE BEEN MOVING AROUND A LOT MORE THAN USUAL: NOT AT ALL
10. IF YOU CHECKED OFF ANY PROBLEMS, HOW DIFFICULT HAVE THESE PROBLEMS MADE IT FOR YOU TO DO YOUR WORK, TAKE CARE OF THINGS AT HOME, OR GET ALONG WITH OTHER PEOPLE: NOT DIFFICULT AT ALL
SUM OF ALL RESPONSES TO PHQ QUESTIONS 1-9: 0
3. TROUBLE FALLING OR STAYING ASLEEP: NOT AT ALL
4. FEELING TIRED OR HAVING LITTLE ENERGY: NOT AT ALL
1. LITTLE INTEREST OR PLEASURE IN DOING THINGS: NOT AT ALL
6. FEELING BAD ABOUT YOURSELF - OR THAT YOU ARE A FAILURE OR HAVE LET YOURSELF OR YOUR FAMILY DOWN: NOT AT ALL
7. TROUBLE CONCENTRATING ON THINGS, SUCH AS READING THE NEWSPAPER OR WATCHING TELEVISION: NOT AT ALL

## 2025-02-14 NOTE — PROGRESS NOTES
Here for f/u, pt has been feeling well.  Pt staets that things are doing well.  Pt has been feeling well, they are looking at moving out of their rental and buy a house.  Pt is looking at a house near where they are renting.  It has been stressful but he is managing.      Here for follow up of ADD.  Pt states that they are doing very well with current therapy and feels that control of symptoms are adequate at this time.  No breakthru symptoms and no need/indication for adjustment in therapy.  Taking meds as prescribed and denies any illicit medication usage of misuse of their stimulant thearpy.  Mood is stable and denies any issues of depression or anxiety associated with treatment of ADD.     Pt here for follow up of blood pressure.  Pt states doing great with adherence to therapy and feels well.  No issues of chest pain, shortness of breath.  No vision changes, headache, swelling in legs.       Except as noted above in the history of present illness, the review of systems is  negative for headache, vision changes, chest pain, shortness of breath, abdominal pain, urinary sx, bowel changes.    Past medical, surgical, and social history reviewed and updated  Medications and allergies reviewed and updated        O: /76   Pulse 92   Ht 1.727 m (5' 8\")   Wt 97.5 kg (215 lb)   SpO2 99%   BMI 32.69 kg/m²   GEN: No acute distress, cooperative, well nourished, alert.   HEENT: PEERLA, EOMI , normocephalic/atraumatic, nares and oropharynx clear.  Mucous membranes normal, Tympanic membranes clear bilaterally.  Neck: soft, supple, no thyromegaly, mass, no Lymphadenopathy  CV: Regular rate and rhythm, no murmur, rubs, gallops. No edema.  Resp: Clear to auscultation bilaterally good air entry bilaterally  No crackles, wheeze. Breathing comfortably.   Psych: mood stable, No suicidal thoughts or ideation         Current Outpatient Medications   Medication Sig Dispense Refill    pantoprazole (PROTONIX) 40 MG tablet Take 1

## 2025-05-14 ENCOUNTER — OFFICE VISIT (OUTPATIENT)
Dept: FAMILY MEDICINE CLINIC | Age: 55
End: 2025-05-14

## 2025-05-14 VITALS
WEIGHT: 217.2 LBS | BODY MASS INDEX: 32.92 KG/M2 | HEART RATE: 79 BPM | TEMPERATURE: 97.1 F | SYSTOLIC BLOOD PRESSURE: 130 MMHG | DIASTOLIC BLOOD PRESSURE: 82 MMHG | HEIGHT: 68 IN | OXYGEN SATURATION: 99 %

## 2025-05-14 DIAGNOSIS — Z12.5 SCREENING FOR PROSTATE CANCER: ICD-10-CM

## 2025-05-14 DIAGNOSIS — F98.8 ATTENTION DEFICIT DISORDER, UNSPECIFIED TYPE: ICD-10-CM

## 2025-05-14 DIAGNOSIS — Z00.00 ROUTINE GENERAL MEDICAL EXAMINATION AT A HEALTH CARE FACILITY: ICD-10-CM

## 2025-05-14 DIAGNOSIS — F90.0 ATTENTION DEFICIT HYPERACTIVITY DISORDER (ADHD), PREDOMINANTLY INATTENTIVE TYPE: Primary | ICD-10-CM

## 2025-05-14 DIAGNOSIS — Z13.6 SCREENING, ISCHEMIC HEART DISEASE: ICD-10-CM

## 2025-05-14 DIAGNOSIS — F41.8 DEPRESSION WITH ANXIETY: ICD-10-CM

## 2025-05-14 RX ORDER — METHYLPHENIDATE HYDROCHLORIDE 54 MG/1
54 TABLET ORAL EVERY MORNING
Qty: 30 TABLET | Refills: 0 | Status: SHIPPED | OUTPATIENT
Start: 2025-07-13 | End: 2025-08-12

## 2025-05-14 RX ORDER — METHYLPHENIDATE HYDROCHLORIDE 5 MG/1
5 TABLET ORAL 3 TIMES DAILY
Qty: 90 TABLET | Refills: 0 | Status: SHIPPED | OUTPATIENT
Start: 2025-05-14 | End: 2025-06-13

## 2025-05-14 RX ORDER — METHYLPHENIDATE HYDROCHLORIDE 5 MG/1
5 TABLET ORAL 3 TIMES DAILY
Qty: 90 TABLET | Refills: 0 | Status: SHIPPED | OUTPATIENT
Start: 2025-06-13 | End: 2025-07-13

## 2025-05-14 RX ORDER — METHYLPHENIDATE HYDROCHLORIDE 54 MG/1
54 TABLET ORAL DAILY
Qty: 30 TABLET | Refills: 0 | Status: SHIPPED | OUTPATIENT
Start: 2025-05-14 | End: 2025-06-13

## 2025-05-14 RX ORDER — METHYLPHENIDATE HYDROCHLORIDE 5 MG/1
5 TABLET ORAL 3 TIMES DAILY
Qty: 90 TABLET | Refills: 0 | Status: SHIPPED | OUTPATIENT
Start: 2025-07-13 | End: 2025-08-12

## 2025-05-14 RX ORDER — METHYLPHENIDATE HYDROCHLORIDE 54 MG/1
54 TABLET ORAL EVERY MORNING
Qty: 30 TABLET | Refills: 0 | Status: SHIPPED | OUTPATIENT
Start: 2025-06-13 | End: 2025-07-13

## 2025-05-14 ASSESSMENT — PATIENT HEALTH QUESTIONNAIRE - PHQ9
SUM OF ALL RESPONSES TO PHQ QUESTIONS 1-9: 0
1. LITTLE INTEREST OR PLEASURE IN DOING THINGS: NOT AT ALL
2. FEELING DOWN, DEPRESSED OR HOPELESS: NOT AT ALL
SUM OF ALL RESPONSES TO PHQ QUESTIONS 1-9: 0

## 2025-05-14 NOTE — PROGRESS NOTES
Here for f/u and recheck of ADD, mood    Pt state that things are doing ok, is in the process of moving.  They are now remodeling the house in prep for them to move him, hopefully in 2 months.  Pt will stay month to month at his current house.  They have signed on the house but waiting for the contract from contractor.  Pt feels he is managing the stressors ok overall.      Here for follow up of ADD.  Pt states that they are doing very well with current therapy and feels that control of symptoms are adequate at this time.  No breakthru symptoms and no need/indication for adjustment in therapy.  Taking meds as prescribed and denies any illicit medication usage of misuse of their stimulant thearpy.  Mood is stable and denies any issues of depression or anxiety associated with treatment of ADD.       Except as noted above in the history of present illness, the review of systems is  negative for headache, vision changes, chest pain, shortness of breath, abdominal pain, urinary sx, bowel changes.    Past medical, surgical, and social history reviewed and updated  Medications and allergies reviewed and updated      O: /82   Pulse 79   Temp 97.1 °F (36.2 °C)   Ht 1.727 m (5' 8\")   Wt 98.5 kg (217 lb 3.2 oz)   SpO2 99%   BMI 33.03 kg/m²   GEN: No acute distress, cooperative, well nourished, alert.   HEENT: PEERLA, EOMI , normocephalic/atraumatic, nares and oropharynx clear.  Mucous membranes normal, Tympanic membranes clear bilaterally.  Neck: soft, supple, no thyromegaly, mass, no Lymphadenopathy  CV: Regular rate and rhythm, no murmur, rubs, gallops. No edema.  Resp: Clear to auscultation bilaterally good air entry bilaterally  No crackles, wheeze. Breathing comfortably.   Psych: mood stable, No suicidal thoughts or ideation         Current Outpatient Medications   Medication Sig Dispense Refill    [START ON 7/13/2025] methylphenidate (RITALIN) 5 MG tablet Take 1 tablet by mouth 3 times daily for 30 days. 90

## 2025-07-28 RX ORDER — PANTOPRAZOLE SODIUM 40 MG/1
40 TABLET, DELAYED RELEASE ORAL DAILY
Qty: 30 TABLET | Refills: 5 | Status: SHIPPED | OUTPATIENT
Start: 2025-07-28

## 2025-07-28 NOTE — TELEPHONE ENCOUNTER
Medication:   Requested Prescriptions     Pending Prescriptions Disp Refills    pantoprazole (PROTONIX) 40 MG tablet [Pharmacy Med Name: PANTOPRAZOLE SOD DR 40 MG TAB] 30 tablet 5     Sig: TAKE 1 TABLET BY MOUTH DAILY     Last Filled:  01/24/25    Last appt: 5/14/2025   Next appt: 8/13/2025    Last [facility]:       2/14/2025     9:10 AM   RX Monitoring   Periodic Controlled Substance Monitoring Possible medication side effects, risk of tolerance/dependence & alternative treatments discussed.;No signs of potential drug abuse or diversion identified.;Assessed functional status (ability to engage in work or other purposeful activities, the pain intensity and its interference with activities of daily living, quality of family life and social activities, and the physical activity)

## 2025-08-13 ENCOUNTER — OFFICE VISIT (OUTPATIENT)
Dept: FAMILY MEDICINE CLINIC | Age: 55
End: 2025-08-13
Payer: COMMERCIAL

## 2025-08-13 VITALS
HEART RATE: 68 BPM | HEIGHT: 68 IN | BODY MASS INDEX: 31.83 KG/M2 | DIASTOLIC BLOOD PRESSURE: 76 MMHG | WEIGHT: 210 LBS | SYSTOLIC BLOOD PRESSURE: 112 MMHG | OXYGEN SATURATION: 98 %

## 2025-08-13 DIAGNOSIS — F41.8 DEPRESSION WITH ANXIETY: Primary | ICD-10-CM

## 2025-08-13 DIAGNOSIS — F90.0 ATTENTION DEFICIT HYPERACTIVITY DISORDER (ADHD), PREDOMINANTLY INATTENTIVE TYPE: ICD-10-CM

## 2025-08-13 DIAGNOSIS — L64.9 ALOPECIA, MALE PATTERN: ICD-10-CM

## 2025-08-13 DIAGNOSIS — F98.8 ATTENTION DEFICIT DISORDER, UNSPECIFIED TYPE: ICD-10-CM

## 2025-08-13 DIAGNOSIS — F43.9 STRESS: ICD-10-CM

## 2025-08-13 PROCEDURE — 3017F COLORECTAL CA SCREEN DOC REV: CPT | Performed by: FAMILY MEDICINE

## 2025-08-13 PROCEDURE — G8417 CALC BMI ABV UP PARAM F/U: HCPCS | Performed by: FAMILY MEDICINE

## 2025-08-13 PROCEDURE — G8427 DOCREV CUR MEDS BY ELIG CLIN: HCPCS | Performed by: FAMILY MEDICINE

## 2025-08-13 PROCEDURE — 1036F TOBACCO NON-USER: CPT | Performed by: FAMILY MEDICINE

## 2025-08-13 PROCEDURE — 99214 OFFICE O/P EST MOD 30 MIN: CPT | Performed by: FAMILY MEDICINE

## 2025-08-13 RX ORDER — METHYLPHENIDATE HYDROCHLORIDE 5 MG/1
5 TABLET ORAL 3 TIMES DAILY
Qty: 90 TABLET | Refills: 0 | Status: SHIPPED | OUTPATIENT
Start: 2025-08-13 | End: 2025-09-12

## 2025-08-13 RX ORDER — METHYLPHENIDATE HYDROCHLORIDE 5 MG/1
5 TABLET ORAL 3 TIMES DAILY
Qty: 90 TABLET | Refills: 0 | Status: SHIPPED | OUTPATIENT
Start: 2025-10-12 | End: 2025-11-11

## 2025-08-13 RX ORDER — METHYLPHENIDATE HYDROCHLORIDE 54 MG/1
54 TABLET ORAL EVERY MORNING
Qty: 30 TABLET | Refills: 0 | Status: SHIPPED | OUTPATIENT
Start: 2025-09-12 | End: 2025-10-12

## 2025-08-13 RX ORDER — METHYLPHENIDATE HYDROCHLORIDE 5 MG/1
5 TABLET ORAL 3 TIMES DAILY
Qty: 90 TABLET | Refills: 0 | Status: SHIPPED | OUTPATIENT
Start: 2025-09-12 | End: 2025-10-12

## 2025-08-13 RX ORDER — METHYLPHENIDATE HYDROCHLORIDE 54 MG/1
54 TABLET ORAL DAILY
Qty: 30 TABLET | Refills: 0 | Status: SHIPPED | OUTPATIENT
Start: 2025-08-13 | End: 2025-09-12

## 2025-08-13 RX ORDER — METHYLPHENIDATE HYDROCHLORIDE 54 MG/1
54 TABLET ORAL EVERY MORNING
Qty: 30 TABLET | Refills: 0 | Status: SHIPPED | OUTPATIENT
Start: 2025-10-12 | End: 2025-11-11

## 2025-08-13 SDOH — ECONOMIC STABILITY: FOOD INSECURITY: WITHIN THE PAST 12 MONTHS, YOU WORRIED THAT YOUR FOOD WOULD RUN OUT BEFORE YOU GOT MONEY TO BUY MORE.: NEVER TRUE

## 2025-08-13 SDOH — ECONOMIC STABILITY: FOOD INSECURITY: WITHIN THE PAST 12 MONTHS, THE FOOD YOU BOUGHT JUST DIDN'T LAST AND YOU DIDN'T HAVE MONEY TO GET MORE.: NEVER TRUE
